# Patient Record
Sex: MALE | Race: OTHER | NOT HISPANIC OR LATINO | Employment: OTHER | URBAN - METROPOLITAN AREA
[De-identification: names, ages, dates, MRNs, and addresses within clinical notes are randomized per-mention and may not be internally consistent; named-entity substitution may affect disease eponyms.]

---

## 2017-07-21 ENCOUNTER — PATIENT MESSAGE (OUTPATIENT)
Dept: MEDICAL GROUP | Facility: MEDICAL CENTER | Age: 67
End: 2017-07-21

## 2017-07-21 NOTE — TELEPHONE ENCOUNTER
Mark,     I received your voicemail left today. Here is the name and address of the Ophthalmology office you were referred to back in 2013. Please let our office know if you need anything else. Have a great weekend.     Arlene   Medical Assistant       Referral information sent to the following:  Ophthalmology    [address]  [place] [id number]  [phone]  ROSALIE MG J

## 2021-01-15 DIAGNOSIS — Z23 NEED FOR VACCINATION: ICD-10-CM

## 2022-04-07 ENCOUNTER — APPOINTMENT (OUTPATIENT)
Dept: RADIOLOGY | Facility: MEDICAL CENTER | Age: 72
DRG: 054 | End: 2022-04-07
Attending: EMERGENCY MEDICINE
Payer: MEDICARE

## 2022-04-07 ENCOUNTER — HOSPITAL ENCOUNTER (INPATIENT)
Facility: MEDICAL CENTER | Age: 72
LOS: 7 days | DRG: 054 | End: 2022-04-14
Attending: EMERGENCY MEDICINE | Admitting: INTERNAL MEDICINE
Payer: MEDICARE

## 2022-04-07 DIAGNOSIS — E11.65 UNCONTROLLED TYPE 2 DIABETES MELLITUS WITH HYPERGLYCEMIA (HCC): ICD-10-CM

## 2022-04-07 DIAGNOSIS — J96.01 ACUTE RESPIRATORY FAILURE WITH HYPOXIA (HCC): ICD-10-CM

## 2022-04-07 DIAGNOSIS — G93.89 BRAIN MASS: ICD-10-CM

## 2022-04-07 LAB
ALBUMIN SERPL BCP-MCNC: 4.4 G/DL (ref 3.2–4.9)
ALBUMIN/GLOB SERPL: 1.4 G/DL
ALP SERPL-CCNC: 106 U/L (ref 30–99)
ALT SERPL-CCNC: 15 U/L (ref 2–50)
ANION GAP SERPL CALC-SCNC: 13 MMOL/L (ref 7–16)
AST SERPL-CCNC: 20 U/L (ref 12–45)
BASOPHILS # BLD AUTO: 0.8 % (ref 0–1.8)
BASOPHILS # BLD: 0.08 K/UL (ref 0–0.12)
BILIRUB SERPL-MCNC: 1.2 MG/DL (ref 0.1–1.5)
BUN SERPL-MCNC: 26 MG/DL (ref 8–22)
CALCIUM SERPL-MCNC: 9.2 MG/DL (ref 8.5–10.5)
CHLORIDE SERPL-SCNC: 102 MMOL/L (ref 96–112)
CO2 SERPL-SCNC: 21 MMOL/L (ref 20–33)
CREAT SERPL-MCNC: 1.01 MG/DL (ref 0.5–1.4)
CRP SERPL HS-MCNC: 0.36 MG/DL (ref 0–0.75)
EOSINOPHIL # BLD AUTO: 0.15 K/UL (ref 0–0.51)
EOSINOPHIL NFR BLD: 1.6 % (ref 0–6.9)
ERYTHROCYTE [DISTWIDTH] IN BLOOD BY AUTOMATED COUNT: 43.6 FL (ref 35.9–50)
GFR SERPLBLD CREATININE-BSD FMLA CKD-EPI: 79 ML/MIN/1.73 M 2
GLOBULIN SER CALC-MCNC: 3.1 G/DL (ref 1.9–3.5)
GLUCOSE SERPL-MCNC: 148 MG/DL (ref 65–99)
HCT VFR BLD AUTO: 36.4 % (ref 42–52)
HGB BLD-MCNC: 12.1 G/DL (ref 14–18)
IMM GRANULOCYTES # BLD AUTO: 0.04 K/UL (ref 0–0.11)
IMM GRANULOCYTES NFR BLD AUTO: 0.4 % (ref 0–0.9)
LYMPHOCYTES # BLD AUTO: 2.48 K/UL (ref 1–4.8)
LYMPHOCYTES NFR BLD: 26.3 % (ref 22–41)
MCH RBC QN AUTO: 28.1 PG (ref 27–33)
MCHC RBC AUTO-ENTMCNC: 33.2 G/DL (ref 33.7–35.3)
MCV RBC AUTO: 84.7 FL (ref 81.4–97.8)
MONOCYTES # BLD AUTO: 0.66 K/UL (ref 0–0.85)
MONOCYTES NFR BLD AUTO: 7 % (ref 0–13.4)
NEUTROPHILS # BLD AUTO: 6.01 K/UL (ref 1.82–7.42)
NEUTROPHILS NFR BLD: 63.9 % (ref 44–72)
NRBC # BLD AUTO: 0 K/UL
NRBC BLD-RTO: 0 /100 WBC
PLATELET # BLD AUTO: 278 K/UL (ref 164–446)
PMV BLD AUTO: 9.1 FL (ref 9–12.9)
POTASSIUM SERPL-SCNC: 3.8 MMOL/L (ref 3.6–5.5)
PROT SERPL-MCNC: 7.5 G/DL (ref 6–8.2)
RBC # BLD AUTO: 4.3 M/UL (ref 4.7–6.1)
SODIUM SERPL-SCNC: 136 MMOL/L (ref 135–145)
WBC # BLD AUTO: 9.4 K/UL (ref 4.8–10.8)

## 2022-04-07 PROCEDURE — 770001 HCHG ROOM/CARE - MED/SURG/GYN PRIV*

## 2022-04-07 PROCEDURE — 85025 COMPLETE CBC W/AUTO DIFF WBC: CPT

## 2022-04-07 PROCEDURE — 36415 COLL VENOUS BLD VENIPUNCTURE: CPT

## 2022-04-07 PROCEDURE — 700111 HCHG RX REV CODE 636 W/ 250 OVERRIDE (IP): Performed by: INTERNAL MEDICINE

## 2022-04-07 PROCEDURE — 96374 THER/PROPH/DIAG INJ IV PUSH: CPT

## 2022-04-07 PROCEDURE — 80053 COMPREHEN METABOLIC PANEL: CPT

## 2022-04-07 PROCEDURE — 700111 HCHG RX REV CODE 636 W/ 250 OVERRIDE (IP)

## 2022-04-07 PROCEDURE — 96375 TX/PRO/DX INJ NEW DRUG ADDON: CPT

## 2022-04-07 PROCEDURE — 700111 HCHG RX REV CODE 636 W/ 250 OVERRIDE (IP): Performed by: EMERGENCY MEDICINE

## 2022-04-07 PROCEDURE — 86140 C-REACTIVE PROTEIN: CPT

## 2022-04-07 PROCEDURE — 99285 EMERGENCY DEPT VISIT HI MDM: CPT

## 2022-04-07 PROCEDURE — 70450 CT HEAD/BRAIN W/O DYE: CPT | Mod: ME

## 2022-04-07 PROCEDURE — 99223 1ST HOSP IP/OBS HIGH 75: CPT | Mod: AI | Performed by: INTERNAL MEDICINE

## 2022-04-07 RX ORDER — ONDANSETRON 2 MG/ML
4 INJECTION INTRAMUSCULAR; INTRAVENOUS ONCE
Status: COMPLETED | OUTPATIENT
Start: 2022-04-07 | End: 2022-04-07

## 2022-04-07 RX ORDER — FLUTICASONE PROPIONATE 50 MCG
1 SPRAY, SUSPENSION (ML) NASAL DAILY
Status: DISCONTINUED | OUTPATIENT
Start: 2022-04-08 | End: 2022-04-08

## 2022-04-07 RX ORDER — OXYCODONE HYDROCHLORIDE 5 MG/1
5 TABLET ORAL
Status: DISCONTINUED | OUTPATIENT
Start: 2022-04-07 | End: 2022-04-14 | Stop reason: HOSPADM

## 2022-04-07 RX ORDER — DEXAMETHASONE SODIUM PHOSPHATE 4 MG/ML
10 INJECTION, SOLUTION INTRA-ARTICULAR; INTRALESIONAL; INTRAMUSCULAR; INTRAVENOUS; SOFT TISSUE ONCE
Status: DISCONTINUED | OUTPATIENT
Start: 2022-04-07 | End: 2022-04-07

## 2022-04-07 RX ORDER — GABAPENTIN 400 MG/1
1200 CAPSULE ORAL 2 TIMES DAILY
Status: DISCONTINUED | OUTPATIENT
Start: 2022-04-08 | End: 2022-04-14 | Stop reason: HOSPADM

## 2022-04-07 RX ORDER — ALBUTEROL SULFATE 90 UG/1
2 AEROSOL, METERED RESPIRATORY (INHALATION) EVERY 6 HOURS PRN
Status: DISCONTINUED | OUTPATIENT
Start: 2022-04-07 | End: 2022-04-14 | Stop reason: HOSPADM

## 2022-04-07 RX ORDER — LORAZEPAM 2 MG/ML
1 INJECTION INTRAMUSCULAR
Status: DISCONTINUED | OUTPATIENT
Start: 2022-04-07 | End: 2022-04-14 | Stop reason: HOSPADM

## 2022-04-07 RX ORDER — HYDROXYZINE HYDROCHLORIDE 25 MG/1
25 TABLET, FILM COATED ORAL EVERY 8 HOURS PRN
Status: DISCONTINUED | OUTPATIENT
Start: 2022-04-07 | End: 2022-04-08

## 2022-04-07 RX ORDER — CIPROFLOXACIN HYDROCHLORIDE 3.5 MG/ML
1 SOLUTION/ DROPS TOPICAL
Status: DISCONTINUED | OUTPATIENT
Start: 2022-04-08 | End: 2022-04-08

## 2022-04-07 RX ORDER — HALOPERIDOL 5 MG/ML
5 INJECTION INTRAMUSCULAR EVERY 8 HOURS PRN
Status: DISCONTINUED | OUTPATIENT
Start: 2022-04-07 | End: 2022-04-08

## 2022-04-07 RX ORDER — HEPARIN SODIUM 5000 [USP'U]/ML
5000 INJECTION, SOLUTION INTRAVENOUS; SUBCUTANEOUS EVERY 8 HOURS
Status: DISCONTINUED | OUTPATIENT
Start: 2022-04-08 | End: 2022-04-08

## 2022-04-07 RX ORDER — OXYCODONE HYDROCHLORIDE 5 MG/1
2.5 TABLET ORAL
Status: DISCONTINUED | OUTPATIENT
Start: 2022-04-07 | End: 2022-04-14 | Stop reason: HOSPADM

## 2022-04-07 RX ORDER — LISINOPRIL 10 MG/1
10 TABLET ORAL DAILY
Status: DISCONTINUED | OUTPATIENT
Start: 2022-04-08 | End: 2022-04-08

## 2022-04-07 RX ORDER — DEXAMETHASONE 4 MG/1
10 TABLET ORAL EVERY 6 HOURS
Status: DISCONTINUED | OUTPATIENT
Start: 2022-04-08 | End: 2022-04-08

## 2022-04-07 RX ORDER — DEXTROSE MONOHYDRATE 25 G/50ML
25 INJECTION, SOLUTION INTRAVENOUS
Status: DISCONTINUED | OUTPATIENT
Start: 2022-04-07 | End: 2022-04-08

## 2022-04-07 RX ORDER — BISACODYL 10 MG
10 SUPPOSITORY, RECTAL RECTAL
Status: DISCONTINUED | OUTPATIENT
Start: 2022-04-07 | End: 2022-04-14 | Stop reason: HOSPADM

## 2022-04-07 RX ORDER — AMOXICILLIN 250 MG
2 CAPSULE ORAL 2 TIMES DAILY
Status: DISCONTINUED | OUTPATIENT
Start: 2022-04-07 | End: 2022-04-14 | Stop reason: HOSPADM

## 2022-04-07 RX ORDER — HYDRALAZINE HYDROCHLORIDE 20 MG/ML
10 INJECTION INTRAMUSCULAR; INTRAVENOUS EVERY 4 HOURS PRN
Status: DISCONTINUED | OUTPATIENT
Start: 2022-04-07 | End: 2022-04-14 | Stop reason: HOSPADM

## 2022-04-07 RX ORDER — GEMFIBROZIL 600 MG/1
600 TABLET, FILM COATED ORAL 2 TIMES DAILY
Status: DISCONTINUED | OUTPATIENT
Start: 2022-04-07 | End: 2022-04-08

## 2022-04-07 RX ORDER — GLIPIZIDE 5 MG/1
20 TABLET, FILM COATED, EXTENDED RELEASE ORAL DAILY
Status: DISCONTINUED | OUTPATIENT
Start: 2022-04-08 | End: 2022-04-08

## 2022-04-07 RX ORDER — HYDROMORPHONE HYDROCHLORIDE 1 MG/ML
0.25 INJECTION, SOLUTION INTRAMUSCULAR; INTRAVENOUS; SUBCUTANEOUS
Status: DISCONTINUED | OUTPATIENT
Start: 2022-04-07 | End: 2022-04-14 | Stop reason: HOSPADM

## 2022-04-07 RX ORDER — OLOPATADINE HYDROCHLORIDE 1 MG/ML
1 SOLUTION/ DROPS OPHTHALMIC 2 TIMES DAILY
Status: DISCONTINUED | OUTPATIENT
Start: 2022-04-07 | End: 2022-04-08

## 2022-04-07 RX ORDER — TRAMADOL HYDROCHLORIDE 50 MG/1
50 TABLET ORAL EVERY 12 HOURS PRN
Status: DISCONTINUED | OUTPATIENT
Start: 2022-04-07 | End: 2022-04-14 | Stop reason: HOSPADM

## 2022-04-07 RX ORDER — POLYETHYLENE GLYCOL 3350 17 G/17G
1 POWDER, FOR SOLUTION ORAL
Status: DISCONTINUED | OUTPATIENT
Start: 2022-04-07 | End: 2022-04-14 | Stop reason: HOSPADM

## 2022-04-07 RX ORDER — GABAPENTIN 300 MG/1
300 CAPSULE ORAL
Status: DISCONTINUED | OUTPATIENT
Start: 2022-04-07 | End: 2022-04-08

## 2022-04-07 RX ADMIN — LORAZEPAM 1 MG: 2 INJECTION INTRAMUSCULAR; INTRAVENOUS at 23:48

## 2022-04-07 RX ADMIN — FENTANYL CITRATE 100 MCG: 50 INJECTION, SOLUTION INTRAMUSCULAR; INTRAVENOUS at 21:28

## 2022-04-07 RX ADMIN — LORAZEPAM 1 MG: 2 INJECTION INTRAMUSCULAR; INTRAVENOUS at 23:51

## 2022-04-07 RX ADMIN — ONDANSETRON 4 MG: 2 INJECTION INTRAMUSCULAR; INTRAVENOUS at 21:28

## 2022-04-07 ASSESSMENT — PAIN DESCRIPTION - PAIN TYPE: TYPE: ACUTE PAIN

## 2022-04-08 ENCOUNTER — APPOINTMENT (OUTPATIENT)
Dept: RADIOLOGY | Facility: MEDICAL CENTER | Age: 72
DRG: 054 | End: 2022-04-08
Attending: NEUROLOGICAL SURGERY
Payer: MEDICARE

## 2022-04-08 PROBLEM — G93.40 ENCEPHALOPATHY ACUTE: Status: ACTIVE | Noted: 2022-04-08

## 2022-04-08 PROBLEM — G92.8 TOXIC METABOLIC ENCEPHALOPATHY: Status: ACTIVE | Noted: 2022-04-08

## 2022-04-08 PROBLEM — J96.01 ACUTE RESPIRATORY FAILURE WITH HYPOXIA (HCC): Status: ACTIVE | Noted: 2022-04-08

## 2022-04-08 LAB
GLUCOSE BLD STRIP.AUTO-MCNC: 120 MG/DL (ref 65–99)
GLUCOSE BLD STRIP.AUTO-MCNC: 174 MG/DL (ref 65–99)
GLUCOSE BLD STRIP.AUTO-MCNC: 225 MG/DL (ref 65–99)
GLUCOSE BLD STRIP.AUTO-MCNC: 244 MG/DL (ref 65–99)

## 2022-04-08 PROCEDURE — 96376 TX/PRO/DX INJ SAME DRUG ADON: CPT

## 2022-04-08 PROCEDURE — 82962 GLUCOSE BLOOD TEST: CPT | Mod: 91

## 2022-04-08 PROCEDURE — A9270 NON-COVERED ITEM OR SERVICE: HCPCS | Performed by: INTERNAL MEDICINE

## 2022-04-08 PROCEDURE — 700102 HCHG RX REV CODE 250 W/ 637 OVERRIDE(OP): Performed by: INTERNAL MEDICINE

## 2022-04-08 PROCEDURE — 700111 HCHG RX REV CODE 636 W/ 250 OVERRIDE (IP): Performed by: INTERNAL MEDICINE

## 2022-04-08 PROCEDURE — 96372 THER/PROPH/DIAG INJ SC/IM: CPT

## 2022-04-08 PROCEDURE — 700111 HCHG RX REV CODE 636 W/ 250 OVERRIDE (IP)

## 2022-04-08 PROCEDURE — 99233 SBSQ HOSP IP/OBS HIGH 50: CPT | Performed by: INTERNAL MEDICINE

## 2022-04-08 PROCEDURE — 770001 HCHG ROOM/CARE - MED/SURG/GYN PRIV*

## 2022-04-08 PROCEDURE — 96375 TX/PRO/DX INJ NEW DRUG ADDON: CPT

## 2022-04-08 RX ORDER — TRAMADOL HYDROCHLORIDE 50 MG/1
50 TABLET ORAL EVERY 6 HOURS PRN
Status: ON HOLD | COMMUNITY
End: 2022-04-13

## 2022-04-08 RX ORDER — FLUOXETINE 10 MG/1
20 CAPSULE ORAL DAILY
COMMUNITY

## 2022-04-08 RX ORDER — VENLAFAXINE HYDROCHLORIDE 75 MG/1
75 CAPSULE, EXTENDED RELEASE ORAL DAILY
Status: DISCONTINUED | OUTPATIENT
Start: 2022-04-08 | End: 2022-04-08

## 2022-04-08 RX ORDER — OMEPRAZOLE 40 MG/1
40 CAPSULE, DELAYED RELEASE ORAL DAILY
COMMUNITY

## 2022-04-08 RX ORDER — LORAZEPAM 2 MG/ML
1 INJECTION INTRAMUSCULAR ONCE
Status: COMPLETED | OUTPATIENT
Start: 2022-04-08 | End: 2022-04-07

## 2022-04-08 RX ORDER — DEXAMETHASONE SODIUM PHOSPHATE 4 MG/ML
10 INJECTION, SOLUTION INTRA-ARTICULAR; INTRALESIONAL; INTRAMUSCULAR; INTRAVENOUS; SOFT TISSUE EVERY 6 HOURS
Status: DISCONTINUED | OUTPATIENT
Start: 2022-04-08 | End: 2022-04-08

## 2022-04-08 RX ORDER — SUVOREXANT 10 MG/1
10 TABLET, FILM COATED ORAL
Status: ON HOLD | COMMUNITY
End: 2022-04-13

## 2022-04-08 RX ORDER — DEXTROSE MONOHYDRATE 25 G/50ML
25 INJECTION, SOLUTION INTRAVENOUS
Status: DISCONTINUED | OUTPATIENT
Start: 2022-04-08 | End: 2022-04-10

## 2022-04-08 RX ORDER — ATORVASTATIN CALCIUM 10 MG/1
10 TABLET, FILM COATED ORAL NIGHTLY
Status: DISCONTINUED | OUTPATIENT
Start: 2022-04-08 | End: 2022-04-14 | Stop reason: HOSPADM

## 2022-04-08 RX ORDER — OMEPRAZOLE 20 MG/1
40 CAPSULE, DELAYED RELEASE ORAL DAILY
Status: DISCONTINUED | OUTPATIENT
Start: 2022-04-08 | End: 2022-04-12

## 2022-04-08 RX ORDER — ATORVASTATIN CALCIUM 10 MG/1
10 TABLET, FILM COATED ORAL NIGHTLY
COMMUNITY

## 2022-04-08 RX ORDER — GABAPENTIN 600 MG/1
1200 TABLET ORAL 2 TIMES DAILY
Status: DISCONTINUED | OUTPATIENT
Start: 2022-04-08 | End: 2022-04-08

## 2022-04-08 RX ORDER — LISINOPRIL 20 MG/1
20 TABLET ORAL DAILY
COMMUNITY

## 2022-04-08 RX ORDER — FLUOXETINE HYDROCHLORIDE 20 MG/1
20 CAPSULE ORAL DAILY
Status: DISCONTINUED | OUTPATIENT
Start: 2022-04-08 | End: 2022-04-14 | Stop reason: HOSPADM

## 2022-04-08 RX ORDER — LISINOPRIL 20 MG/1
20 TABLET ORAL DAILY
Status: DISCONTINUED | OUTPATIENT
Start: 2022-04-08 | End: 2022-04-14 | Stop reason: HOSPADM

## 2022-04-08 RX ORDER — HYDROXYZINE HYDROCHLORIDE 10 MG/1
10 TABLET, FILM COATED ORAL EVERY 8 HOURS PRN
COMMUNITY

## 2022-04-08 RX ORDER — HYDROXYZINE HYDROCHLORIDE 10 MG/1
10 TABLET, FILM COATED ORAL EVERY 8 HOURS PRN
Status: DISCONTINUED | OUTPATIENT
Start: 2022-04-08 | End: 2022-04-08

## 2022-04-08 RX ORDER — VENLAFAXINE HYDROCHLORIDE 75 MG/1
75 CAPSULE, EXTENDED RELEASE ORAL DAILY
COMMUNITY

## 2022-04-08 RX ORDER — DEXAMETHASONE SODIUM PHOSPHATE 4 MG/ML
4 INJECTION, SOLUTION INTRA-ARTICULAR; INTRALESIONAL; INTRAMUSCULAR; INTRAVENOUS; SOFT TISSUE EVERY 6 HOURS
Status: DISCONTINUED | OUTPATIENT
Start: 2022-04-08 | End: 2022-04-14 | Stop reason: HOSPADM

## 2022-04-08 RX ORDER — GABAPENTIN 600 MG/1
1200 TABLET ORAL 2 TIMES DAILY
COMMUNITY

## 2022-04-08 RX ORDER — GLIPIZIDE 10 MG/1
10 TABLET ORAL 2 TIMES DAILY
COMMUNITY

## 2022-04-08 RX ORDER — HALOPERIDOL 5 MG/ML
1 INJECTION INTRAMUSCULAR EVERY 8 HOURS
Status: DISCONTINUED | OUTPATIENT
Start: 2022-04-08 | End: 2022-04-14 | Stop reason: HOSPADM

## 2022-04-08 RX ORDER — LEVETIRACETAM 500 MG/1
500 TABLET ORAL 2 TIMES DAILY
Status: DISCONTINUED | OUTPATIENT
Start: 2022-04-08 | End: 2022-04-14 | Stop reason: HOSPADM

## 2022-04-08 RX ADMIN — DEXAMETHASONE SODIUM PHOSPHATE 10 MG: 4 INJECTION, SOLUTION INTRA-ARTICULAR; INTRALESIONAL; INTRAMUSCULAR; INTRAVENOUS; SOFT TISSUE at 00:40

## 2022-04-08 RX ADMIN — LEVETIRACETAM 500 MG: 500 TABLET, FILM COATED ORAL at 14:09

## 2022-04-08 RX ADMIN — INSULIN HUMAN 3 UNITS: 100 INJECTION, SOLUTION PARENTERAL at 18:00

## 2022-04-08 RX ADMIN — LORAZEPAM 1 MG: 2 INJECTION INTRAMUSCULAR; INTRAVENOUS at 01:48

## 2022-04-08 RX ADMIN — LISINOPRIL 20 MG: 20 TABLET ORAL at 14:09

## 2022-04-08 RX ADMIN — LEVETIRACETAM 500 MG: 500 TABLET, FILM COATED ORAL at 21:09

## 2022-04-08 RX ADMIN — ATORVASTATIN CALCIUM 10 MG: 10 TABLET, FILM COATED ORAL at 20:48

## 2022-04-08 RX ADMIN — DEXAMETHASONE SODIUM PHOSPHATE 10 MG: 4 INJECTION, SOLUTION INTRA-ARTICULAR; INTRALESIONAL; INTRAMUSCULAR; INTRAVENOUS; SOFT TISSUE at 05:38

## 2022-04-08 RX ADMIN — OMEPRAZOLE 40 MG: 20 CAPSULE, DELAYED RELEASE ORAL at 14:08

## 2022-04-08 RX ADMIN — HALOPERIDOL LACTATE 1 MG: 5 INJECTION, SOLUTION INTRAMUSCULAR at 21:04

## 2022-04-08 RX ADMIN — INSULIN HUMAN 1 UNITS: 100 INJECTION, SOLUTION PARENTERAL at 05:37

## 2022-04-08 RX ADMIN — HALOPERIDOL LACTATE 5 MG: 5 INJECTION, SOLUTION INTRAMUSCULAR at 10:11

## 2022-04-08 RX ADMIN — HALOPERIDOL LACTATE 5 MG: 5 INJECTION, SOLUTION INTRAMUSCULAR at 00:59

## 2022-04-08 RX ADMIN — DEXAMETHASONE SODIUM PHOSPHATE 4 MG: 4 INJECTION, SOLUTION INTRA-ARTICULAR; INTRALESIONAL; INTRAMUSCULAR; INTRAVENOUS; SOFT TISSUE at 18:00

## 2022-04-08 RX ADMIN — FLUOXETINE 20 MG: 20 CAPSULE ORAL at 14:08

## 2022-04-08 RX ADMIN — SENNOSIDES AND DOCUSATE SODIUM 2 TABLET: 50; 8.6 TABLET ORAL at 18:00

## 2022-04-08 RX ADMIN — HEPARIN SODIUM 5000 UNITS: 5000 INJECTION, SOLUTION INTRAVENOUS; SUBCUTANEOUS at 05:35

## 2022-04-08 RX ADMIN — LORAZEPAM 1 MG: 2 INJECTION INTRAMUSCULAR; INTRAVENOUS at 10:11

## 2022-04-08 RX ADMIN — DEXAMETHASONE SODIUM PHOSPHATE 4 MG: 4 INJECTION, SOLUTION INTRA-ARTICULAR; INTRALESIONAL; INTRAMUSCULAR; INTRAVENOUS; SOFT TISSUE at 14:09

## 2022-04-08 ASSESSMENT — PAIN DESCRIPTION - PAIN TYPE
TYPE: ACUTE PAIN
TYPE: ACUTE PAIN

## 2022-04-08 NOTE — ASSESSMENT & PLAN NOTE
Secondary to brain mass  Haldol 1mg IV every 8 hours   Seizure aspiration and fall precautions   Neurosurgery following   Continue steroids and keppra

## 2022-04-08 NOTE — ED NOTES
Med rec completed per Osiel (pt fills his medications at a [facility] in [place])

## 2022-04-08 NOTE — PROGRESS NOTES
Pts partner came to bedside and brought home medications (lisinopril and metformin). Partner (Jose Valenzuela) states that pt only takes these two medications because the others make him sick.

## 2022-04-08 NOTE — CONSULTS
Patient presents to the ER with visual obscuration and finding on CT scan of right parietal mass with vasogenic edema and midline shift.    Per reports patient aside from vision is intact.    Plan  MRI of the brain with and without  MRI stealth brain with and without  Dexamethasone 10 every 6 for now  Hold Keppra  I will see first thing in the morning.    Lm Lewis

## 2022-04-08 NOTE — ED NOTES
RN to bedside after hearing patient yell out while RN was in room next door. RN visualized pt on knees at the end of the gurney. Pt self removed PIV. Pt assisted to bed and assessed by ERP, Dr. Freitas. VSS. Pt AxO 4. Pt educated again on call light and safety precautions. New PIV placed. Pt verbalized and demonstrated understanding. RN left room.  RN returned to bedside. Pt agitated, anxious, and thrashing in bed attempting to remove equipment and pull out PIV screaming about how anxious he is and that he cannot have all of the wires on him. RN notified Dr. Salinas and new orders received. Pt placed in bilateral soft wrist restraints and given a total of 2mg IV lorazepam. Admitting MD to bedside for assessment. Pt placed on 2L NC post lorazepam administration. Safety precautions remain in place. Report given to SUE Schultz. Care relinquished.

## 2022-04-08 NOTE — ASSESSMENT & PLAN NOTE
Regular insulin sliding scale every 6 hours as needed  Lantus added 4/9, CTM, titrate daily to goal bg

## 2022-04-08 NOTE — PROGRESS NOTES
[facility] Medicine Daily Progress Note    Date of Service  4/8/2022    Chief Complaint  Mark Marcelo is a 72 y.o. male admitted 4/7/2022 with headache.     Hospital Course  Mark Marcelo is a 72 y.o. male with history of diabetes, hypertension, dyslipidemia who presented 4/7/2022 with 12 hours of worsening headache prompting evaluation emergency department where he is found to have a right temporal mass concerning for glioblastoma    Interval Problem Update  Patient was seen and examined at bedside.      A&O x0, opens eyes to voice, follows commands  Very agitated requiring ativan and bilateral wrist restraints for safety  Await MRI brain  Neurosurgery recs  Keppra 500mg BID    I have personally seen and examined the patient at bedside. I discussed the plan of care with patient, bedside RN and neurosurgery.    Consultants/Specialty  neurosurgery    Code Status  Full Code    Disposition  Patient is not medically cleared for discharge.   Anticipate discharge to to home with close outpatient follow-up.  I have placed the appropriate orders for post-discharge needs.    Review of Systems  Review of Systems   Unable to perform ROS: Acuity of condition        Physical Exam  Temp:  [36.3 °C (97.4 °F)-36.9 °C (98.5 °F)] 36.9 °C (98.5 °F)  Pulse:  [76-90] 81  Resp:  [16-23] 23  BP: ()/(59-86) 99/59  SpO2:  [84 %-98 %] 96 %    Physical Exam  Vitals and nursing note reviewed.   Constitutional:       General: He is not in acute distress.     Appearance: He is ill-appearing.   HENT:      Head: Normocephalic.      Right Ear: External ear normal.      Left Ear: External ear normal.      Nose: Nose normal. No congestion.      Mouth/Throat:      Mouth: Mucous membranes are moist.      Pharynx: No oropharyngeal exudate.   Eyes:      General: No scleral icterus.     Extraocular Movements: Extraocular movements intact.      Pupils: Pupils are equal, round, and reactive to light.   Cardiovascular:      Rate and Rhythm: Normal rate and  regular rhythm.      Heart sounds: No murmur heard.  Pulmonary:      Breath sounds: No wheezing.   Abdominal:      Palpations: Abdomen is soft.      Tenderness: There is no abdominal tenderness. There is no guarding or rebound.   Musculoskeletal:         General: No swelling or deformity.   Skin:     General: Skin is warm.      Coloration: Skin is not jaundiced.      Findings: No bruising.   Neurological:      Mental Status: He is alert.      Comments: Opens eyes to voice  Moves all extremities spontaneously  Follows commands (squeezes hand when instructed)  Nonsensical speech    Psychiatric:         Speech: He is noncommunicative.         Behavior: Behavior is agitated, aggressive and combative.         Fluids    Intake/Output Summary (Last 24 hours) at 4/8/2022 1124  Last data filed at 4/8/2022 0110  Gross per 24 hour   Intake --   Output 300 ml   Net -300 ml       Laboratory  Recent Labs     04/07/22  2131   WBC 9.4   RBC 4.30*   HEMOGLOBIN 12.1*   HEMATOCRIT 36.4*   MCV 84.7   MCH 28.1   MCHC 33.2*   RDW 43.6   PLATELETCT 278   MPV 9.1     Recent Labs     04/07/22  2131   SODIUM 136   POTASSIUM 3.8   CHLORIDE 102   CO2 21   GLUCOSE 148*   BUN 26*   CREATININE 1.01   CALCIUM 9.2                   Imaging  CT-HEAD W/O   Final Result         1.  Heterogeneous right parietal mass with associated vasogenic edema resulting in effacement of the posterior right ventricle and 5 mm right to left midline shift. Further characterization with MRI of the brain with contrast recommended.   2.  Atherosclerosis.         MR-STEALTH BRAIN WITH & W/O    (Results Pending)   MR-BRAIN-WITH & W/O    (Results Pending)        Assessment/Plan  * Brain mass- (present on admission)  Assessment & Plan  Complicated by vasogenic edema and a 5 mm right to left shift.   Visual field compromise  Dexamethasone 10mg q6 x12 hours  Then dexamethasone 4mg q6   Keppra 500mg BID  Await MRI brain   Neurosurgery recs    Acute respiratory failure with  hypoxia (HCC)  Assessment & Plan  Possibly sequelae of sedation  SpO2 84% on room air  Supplemental oxygen to maintain SpO2 >92%  monitor    Toxic metabolic encephalopathy  Assessment & Plan  Secondary to brain mass  Ativan 1 mg x 2, soft wrist restraints.  Haldol 1mg IV every 8 hours     Diabetes type 2, uncontrolled (HCC)- (present on admission)  Assessment & Plan  Regular insulin sliding scale every 6 hours as needed, follow-up A1c    Depression- (present on admission)  Assessment & Plan  Home SSRI       VTE prophylaxis: SCDs/TEDs and enoxaparin ppx    I have performed a physical exam and reviewed and updated ROS and Plan today (4/8/2022). In review of yesterday's note (4/7/2022), there are no changes except as documented above.

## 2022-04-08 NOTE — H&P
Hospital Medicine History & Physical Note    Date of Service  4/7/22    Primary Care Physician  MELECIO Saunders.    Consultants  neurosurgery    Specialist Names: Dr. Lewis    Code Status  Full Code    Chief Complaint  Chief Complaint   Patient presents with   • Headache     Pt reports frontal headache for approx 12 hrs, progressively getting worse    • Eye Pain     Pt reports pain behind right eye for approx 12 hrs       History of Presenting Illness  Mark Marcelo is a 72 y.o. male with history of diabetes, hypertension, dyslipidemia who presented 4/7/2022 with 12 hours of worsening headache prompting evaluation emergency department where he is found to have a right temporal mass concerning for glioblastoma.  Neurosurgery is consulted recommending Decadron.  Patient receives IV fentanyl and is referred to the hospitalist for admission.  MD contacted by nurse prior to bedside eval.  He has developed delirium and agitation.  Ripping out his IV and kneeling on the ground yelling he has mental health issues, denies alcohol or drugs.  He is unable to elaborate upon his mental health.  He receives Ativan 1 mg IV x2 and is seen at bedside asleep in no acute distress.      I discussed the plan of care with patient.    Review of Systems  Review of Systems   Unable to perform ROS: Mental status change (Sedation)       Past Medical History   has a past medical history of Depression, Diabetes, Diabetes type 2, uncontrolled (HCC) (7/25/2013), Dyspnea (7/25/2013), Hyperlipidemia, Hypertension associated with diabetes (HCC), Insomnia, Joint pain (7/25/2013), Neuropathy (2/16/2013), OPTIC NEURITIS (7/25/2013), Pseudophakia of right eye (7/30/2013), Ulcer, and Vitamin d deficiency.    Surgical History   has a past surgical history that includes orif, ankle; gastric resection; retinal detachment repair; and cataract phaco with iol (4/18/13).     Family History  family history includes Diabetes in his maternal grandmother and  paternal grandmother; Heart Disease in his mother.   Family history reviewed with patient. There is no family history that is pertinent to the chief complaint.     Social History   reports that he quit smoking about 12 years ago. His smoking use included cigarettes. He has a 40.00 pack-year smoking history. He has never used smokeless tobacco. He reports that he does not drink alcohol and does not use drugs.    Allergies  Allergies   Allergen Reactions   • Acetaminophen Vomiting   • Advil [Ibuprofen]        Medications  Prior to Admission Medications   Prescriptions Last Dose Informant Patient Reported? Taking?   Blood Glucose Monitoring Suppl SUPPLIES MISC   No No   Si Device by Does not apply route 2 times a day as needed. Glucose monitor of pt choice with Test strips and lancets for monitor.  Check glucose bid  Dx 250.00   PROAIR  (90 BASE) MCG/ACT AERS   No No   Sig: Inhale 2 Puffs by mouth every 6 hours as needed for Shortness of Breath.   ciprofloxacin (CILOXIN) 0.3 % SOLN   No No   Sig: Place 1 Drop in both eyes every 2 hours.   fluticasone (FLONASE) 50 MCG/ACT nasal spray   No No   Sig: Spray 1 Spray in nose every day.   gabapentin (NEURONTIN) 100 MG Cap   No No   Sig: Take 1 Cap by mouth every evening.   gabapentin (NEURONTIN) 300 MG Cap   No No   Sig: Take 1 Cap by mouth every bedtime.   gemfibrozil (LOPID) 600 MG Tab   No No   Sig: Take 1 Tab by mouth 2 times a day.   glipiZIDE SR (GLUCOTROL) 10 MG TABLET SR 24 HR   No No   Sig: Take 2 Tabs by mouth every day.   hydrOXYzine (ATARAX) 25 MG Tab   No No   Sig: Take 1 Tab by mouth every 8 hours as needed for Itching.   ketoconazole (NIZORAL) 2 % Cream   No No   Sig: Apply 1 Application to affected area(s) 2 times a day.   lisinopril (PRINIVIL) 10 MG Tab   No No   Sig: Take 1 Tab by mouth every day.   lisinopril (PRINIVIL) 20 MG Tab   No No   Sig: TAKE ONE TABLET BY MOUTH ONCE DAILY   loratadine (CLARITIN) 10 MG TABS   Yes No   Sig: Take 10 mg by  mouth every day.   metformin (GLUCOPHAGE) 1000 MG tablet   No No   Sig: Take 1 Tab by mouth 2 times a day, with meals.   olopatadine (PATANOL) 0.1 % ophthalmic solution   No No   Sig: Place 1 Drop in both eyes 2 times a day.   omeprazole (PRILOSEC) 20 MG delayed-release capsule   No No   Sig: Take 1 Cap by mouth every day.   simvastatin (ZOCOR) 20 MG Tab   No No   Sig: Take 1 Tab by mouth every evening.   terbinafine (LAMISIL) 250 MG TABS   No No   Sig: TAKE ONE TABLET BY MOUTH EVERY DAY   tramadol (ULTRAM) 50 MG Tab   No No   Sig: Take 1-2 Tabs by mouth every 12 hours as needed.   vitamin D, Ergocalciferol, (DRISDOL) 14771 UNITS Cap capsule   No No   Sig: Take 1 Cap by mouth every 7 days.      Facility-Administered Medications: None       Physical Exam  Temp:  [36.3 °C (97.4 °F)] 36.3 °C (97.4 °F)  Pulse:  [76-83] 83  Resp:  [18] 18  BP: (135-159)/(74-86) 151/80  SpO2:  [92 %-96 %] 92 %  Blood Pressure : 151/80   Temperature: 36.3 °C (97.4 °F)   Pulse: 83   Respiration: 18   Pulse Oximetry: 92 %       Physical Exam  Vitals and nursing note reviewed.   Constitutional:       General: He is not in acute distress.     Appearance: Normal appearance. He is normal weight. He is not toxic-appearing.   HENT:      Head: Normocephalic and atraumatic.      Nose: Nose normal. No congestion or rhinorrhea.      Mouth/Throat:      Mouth: Mucous membranes are moist.      Pharynx: Oropharynx is clear.   Eyes:      Extraocular Movements: Extraocular movements intact.      Conjunctiva/sclera: Conjunctivae normal.      Pupils: Pupils are equal, round, and reactive to light.   Neck:      Vascular: No carotid bruit.   Cardiovascular:      Rate and Rhythm: Normal rate and regular rhythm.      Pulses: Normal pulses.      Heart sounds: Normal heart sounds. No murmur heard.    No gallop.   Pulmonary:      Effort: No respiratory distress.      Breath sounds: Normal breath sounds. No wheezing or rales.   Abdominal:      General: Abdomen is  flat. Bowel sounds are normal. There is no distension.      Palpations: Abdomen is soft. There is no mass.      Tenderness: There is no abdominal tenderness.      Hernia: No hernia is present.   Musculoskeletal:         General: No tenderness or signs of injury.      Cervical back: Normal range of motion and neck supple. No muscular tenderness.   Lymphadenopathy:      Cervical: No cervical adenopathy.   Skin:     Capillary Refill: Capillary refill takes less than 2 seconds.      Coloration: Skin is not jaundiced or pale.      Findings: No bruising.      Comments: Chronic skin change of the lower extremities suggestive of peripheral vascular disease.   Neurological:      General: No focal deficit present.      Mental Status: He is alert and oriented to person, place, and time. Mental status is at baseline.      Cranial Nerves: No cranial nerve deficit.      Motor: No weakness.      Coordination: Coordination normal.   Psychiatric:         Mood and Affect: Mood normal.         Thought Content: Thought content normal.         Judgment: Judgment normal.         Laboratory:  Recent Labs     04/07/22  2131   WBC 9.4   RBC 4.30*   HEMOGLOBIN 12.1*   HEMATOCRIT 36.4*   MCV 84.7   MCH 28.1   MCHC 33.2*   RDW 43.6   PLATELETCT 278   MPV 9.1     Recent Labs     04/07/22  2131   SODIUM 136   POTASSIUM 3.8   CHLORIDE 102   CO2 21   GLUCOSE 148*   BUN 26*   CREATININE 1.01   CALCIUM 9.2     Recent Labs     04/07/22  2131   ALTSGPT 15   ASTSGOT 20   ALKPHOSPHAT 106*   TBILIRUBIN 1.2   GLUCOSE 148*         No results for input(s): NTPROBNP in the last 72 hours.      No results for input(s): TROPONINT in the last 72 hours.    Imaging:  CT-HEAD W/O   Final Result         1.  Heterogeneous right parietal mass with associated vasogenic edema resulting in effacement of the posterior right ventricle and 5 mm right to left midline shift. Further characterization with MRI of the brain with contrast recommended.   2.  Atherosclerosis.          MR-STEALTH BRAIN WITH & W/O    (Results Pending)   MR-BRAIN-WITH & W/O    (Results Pending)       X-Ray:  I have personally reviewed the images and compared with prior images.    Assessment/Plan:  I anticipate this patient will require at least two midnights for appropriate medical management, necessitating inpatient admission.    * Brain mass- (present on admission)  Assessment & Plan  Complicated by vasogenic edema and a 5 mm right to left shift.  Decadron ordered, follow-up MRI brain and neurosurgery consult    Encephalopathy acute  Assessment & Plan  Patient became agitated shortly after disclosure of findings.  Stating he has mental health issues but unable to specify.  He denies current medications or treatment.  He requires Ativan 1 mg x 2, soft wrist restraints.  Haldol 5 mg IV every 8 hours as needed    Diabetes type 2, uncontrolled (HCC)- (present on admission)  Assessment & Plan  Regular insulin sliding scale every 6 hours as needed, follow-up A1c    Depression- (present on admission)  Assessment & Plan  Consider SSRI trial      VTE prophylaxis: SCDs/TEDs

## 2022-04-08 NOTE — ED NOTES
Report from RYAN Rn. Pt in MRI. Per MRI tech pt unable to stay still in MRI. This RN to MRI to medicate pt for procedure.

## 2022-04-08 NOTE — ASSESSMENT & PLAN NOTE
Possibly sequelae of sedation  SpO2 84% on room air  Supplemental oxygen to maintain SpO2 >92%  Now resolved

## 2022-04-08 NOTE — ED TRIAGE NOTES
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     Pt reports having a headache and pain behind his right eye for approx 12 hrs. Pt states that he had a retinal detachment surgery on his right eye about 2.5 yrs ago.     Pt reports he took 2 baby aspirin for his headache without relief.   Pt denies any recent trauma or recent N/V/D.     Explained triage process and to notify staff of any changes.

## 2022-04-08 NOTE — ED NOTES
Patient resting.  No signs of distress noted.   Equal chest rise and fall.   No further needs at this time.

## 2022-04-08 NOTE — ASSESSMENT & PLAN NOTE
MRI showing infiltrative mass in the right parieto-occipital region complicated by vasogenic edema and a 5 mm right to left shift, consistent with high-grade glioma   Visual field compromise  dexamethasone 4mg q6   Keppra 500mg BID  CT C/A/P without obvious primary malignancy seen   Neurosurgery following, pt and SO want care in their home [facility] [place] transfer requested and accepted

## 2022-04-08 NOTE — ED NOTES
Unable to obtain medrec at this time, per patient's nurse, the patient is agitated and currently in soft restraints, unable to interview

## 2022-04-08 NOTE — CONSULTS
Chief complaint right parietal lesion  History of present illness this is a 72-year-old gentleman comes in with altered mental status and vision cut he was combative last night got sedated is nonfocal in terms of examination aside from concern for visual field cut and has imaging with CT scan demonstrating significant mount of vasogenic edema and what appears to be likely an infiltrative lesion in the right parietal region.  He has no other history that we know of.    12 point review of systems was positive for disorientation and visual field cut to the left.    Past medical history unknown  Past surgical history noncontributory  Medications please see EMR.    Physical examination the patient is currently sedated he was combative last night but prior to that apparently he was oriented and not confused he did not have any focal weakness or sensory deficits but was reported to have a vision cut to the right at this time he is unable to participate with the exam due to the level of sedation he has in the ER.    Imaging the patient has a CT head of the from last night which demonstrates a heterogenous mass in the right parietal region that is difficult to define however there is significant loss of vasogenic edema and entrapment of the right temporal horn.    Assessment and plan  At this time we would recommend placing the patient on some dexamethasone 10 every 6 and followed by for every 6 after 12 hours of the larger dose  Patient should be on Keppra 500 twice daily  MRIs have been ordered he needs an MRI with and without contrast, with stealth protocol in addition added as a separate sequence.    Once this is completed we will be able to  the family as to our recommendations.    He should also have a full oncology work-up with a chest abdomen pelvis with and without contrast for evaluation for possible primary source of metastatic disease.    We will continue to follow and evaluate the patient as imaging is  completed.    Total 50 minutes in direct patient care coordination consultation

## 2022-04-08 NOTE — ED NOTES
Pt seen hitting himself in the head, pt redirected by staff. Pt has been seen talking with another pt, who seems to be able to redirect him. Pt has been complaining of worsening pain and discomfort. Staff apologized to pt for wait times.

## 2022-04-08 NOTE — ED PROVIDER NOTES
ED Provider Note    CHIEF COMPLAINT  Chief Complaint   Patient presents with   • Headache     Pt reports frontal headache for approx 12 hrs, progressively getting worse    • Eye Pain     Pt reports pain behind right eye for approx 12 hrs       HPI  Mark Marcelo is a 72 y.o. male who presents with a headache.  The patient states he had a headache over the last 12 hours.  He states is progressively been worse.  He states that started in the frontal region and is now diffuse.  He states the pain is severe in intensity.  He is unaware of any exacerbating or relieving factors.  He has also developed some pain behind the left eye.  He states he is almost blind he had no change in his vision.  He has not had any associated vomiting.  He states a couple days ago he had a fever but he has not had a fever in the last 24 hours.  Does not have any associated rhinorrhea nor cough.  The patient states that he is vaccinated.  The patient does have a history of optic neuritis but he states this feels different.    REVIEW OF SYSTEMS  See HPI for further details. All other systems are negative.     PAST MEDICAL HISTORY  Past Medical History:   Diagnosis Date   • Pseudophakia of right eye 7/30/2013    Dr Beckham   • Diabetes type 2, uncontrolled (HCC) 7/25/2013    not at goal hisotrically. Currently worse than usual due to prednisone Rx by ophthalmology. Increase meds and RTC 2-3 weeks. Check sugars bid.   • OPTIC NEURITIS 7/25/2013    Continue prednisone taper as per Ophthalmology   • Dyspnea 7/25/2013    suspect COPD and likely MOOKIE. Sats are adequate. Consider overnight oximetry, sleep study. CXR ordered.   • Joint pain 7/25/2013   • Neuropathy 2/16/2013   • Depression     was on cymbalta and abilify.  he was taken off abilify d/t elevated  glucose   • Diabetes    • Hyperlipidemia    • Hypertension associated with diabetes (HCC)    • Insomnia    • Ulcer    • Vitamin d deficiency        FAMILY HISTORY  @[email or web address]@    [employer]  HISTORY  Social History     Socioeconomic History   • Marital status: Single   Tobacco Use   • Smoking status: Former Smoker     Packs/day: 1.00     Years: 40.00     Pack years: 40.00     Types: Cigarettes     Quit date: 2010     Years since quittin.2   • Smokeless tobacco: Never Used   Vaping Use   • Vaping Use: Never used   Substance and Sexual Activity   • Alcohol use: No     Alcohol/week: 0.0 oz     Comment: hx of heavy etoh abuse   • Drug use: No       SURGICAL HISTORY  Past Surgical History:   Procedure Laterality Date   • CATARACT PHACO WITH IOL  13    Dr Beckham   • GASTRIC RESECTION      ulcer bleeding   • ORIF, ANKLE      right   • RETINAL DETACHMENT REPAIR         CURRENT MEDICATIONS  Home Medications     Reviewed by Michelle Weinstein R.N. (Registered Nurse) on 22 at [facility]  Med List Status: Partial   Medication Last Dose Status   Blood Glucose Monitoring Suppl SUPPLIES MISC  Active   ciprofloxacin (CILOXIN) 0.3 % SOLN  Active   fluticasone (FLONASE) 50 MCG/ACT nasal spray  Active   gabapentin (NEURONTIN) 100 MG Cap  Active   gabapentin (NEURONTIN) 300 MG Cap  Active   gemfibrozil (LOPID) 600 MG Tab  Active   glipiZIDE SR (GLUCOTROL) 10 MG TABLET SR 24 HR  Active   hydrOXYzine (ATARAX) 25 MG Tab  Active   ketoconazole (NIZORAL) 2 % Cream  Active   lisinopril (PRINIVIL) 10 MG Tab  Active   lisinopril (PRINIVIL) 20 MG Tab  Active   loratadine (CLARITIN) 10 MG TABS  Active   metformin (GLUCOPHAGE) 1000 MG tablet  Active   olopatadine (PATANOL) 0.1 % ophthalmic solution  Active   omeprazole (PRILOSEC) 20 MG delayed-release capsule  Active   PROAIR  (90 BASE) MCG/ACT AERS  Active   simvastatin (ZOCOR) 20 MG Tab  Active   terbinafine (LAMISIL) 250 MG TABS  Active   tramadol (ULTRAM) 50 MG Tab  Active   vitamin D, Ergocalciferol, (DRISDOL) 41262 UNITS Cap capsule  Active                ALLERGIES  Allergies   Allergen Reactions   • Acetaminophen Vomiting   • Advil [Ibuprofen]   "      PHYSICAL EXAM  VITAL SIGNS: /74   Pulse 76   Temp 36.3 °C (97.4 °F) (Temporal)   Resp 18   Ht 1.676 m (5' 6\")   Wt 84.2 kg (185 lb 10 oz)   SpO2 96%   BMI 29.96 kg/m²       Constitutional: Mild acute distress, Non-toxic appearance.   HENT: Normocephalic, Atraumatic, Bilateral external ears normal, Oropharynx moist, No oral exudates, Nose normal.   Eyes: Pinpoint pupils, EOMI, Conjunctiva normal, No discharge.   Neck: Normal range of motion, No tenderness, Supple, No stridor.   Lymphatic: No lymphadenopathy noted.   Cardiovascular: Normal heart rate, Normal rhythm, No murmurs, No rubs, No gallops.   Thorax & Lungs: Normal breath sounds, No respiratory distress, No wheezing, No chest tenderness.   Abdomen: Bowel sounds normal, Soft, No tenderness, No masses, No pulsatile masses.   Skin: Warm, Dry, No erythema, No rash.   Back: No tenderness, No CVA tenderness.    Extremities: Intact distal pulses, No edema, No tenderness, No cyanosis, No clubbing.   Neurologic: Alert & oriented x 3, Normal motor function, Normal sensory function, No focal deficits noted.   Psychiatric: Affect normal, Judgment normal, Mood normal.     RADIOLOGY/PROCEDURES  CT-HEAD W/O   Final Result         1.  Heterogeneous right parietal mass with associated vasogenic edema resulting in effacement of the posterior right ventricle and 5 mm right to left midline shift. Further characterization with MRI of the brain with contrast recommended.   2.  Atherosclerosis.             Results for orders placed or performed during the hospital encounter of 04/07/22   CBC WITH DIFFERENTIAL   Result Value Ref Range    WBC 9.4 4.8 - 10.8 K/uL    RBC 4.30 (L) 4.70 - 6.10 M/uL    Hemoglobin 12.1 (L) 14.0 - 18.0 g/dL    Hematocrit 36.4 (L) 42.0 - 52.0 %    MCV 84.7 81.4 - 97.8 fL    MCH 28.1 27.0 - 33.0 pg    MCHC 33.2 (L) 33.7 - 35.3 g/dL    RDW 43.6 35.9 - 50.0 fL    Platelet Count 278 164 - 446 K/uL    MPV 9.1 9.0 - 12.9 fL    Neutrophils-Polys " 63.90 44.00 - 72.00 %    Lymphocytes 26.30 22.00 - 41.00 %    Monocytes 7.00 0.00 - 13.40 %    Eosinophils 1.60 0.00 - 6.90 %    Basophils 0.80 0.00 - 1.80 %    Immature Granulocytes 0.40 0.00 - 0.90 %    Nucleated RBC 0.00 /100 WBC    Neutrophils (Absolute) 6.01 1.82 - 7.42 K/uL    Lymphs (Absolute) 2.48 1.00 - 4.80 K/uL    Monos (Absolute) 0.66 0.00 - 0.85 K/uL    Eos (Absolute) 0.15 0.00 - 0.51 K/uL    Baso (Absolute) 0.08 0.00 - 0.12 K/uL    Immature Granulocytes (abs) 0.04 0.00 - 0.11 K/uL    NRBC (Absolute) 0.00 K/uL   COMP METABOLIC PANEL   Result Value Ref Range    Sodium 136 135 - 145 mmol/L    Potassium 3.8 3.6 - 5.5 mmol/L    Chloride 102 96 - 112 mmol/L    Co2 21 20 - 33 mmol/L    Anion Gap 13.0 7.0 - 16.0    Glucose 148 (H) 65 - 99 mg/dL    Bun 26 (H) 8 - 22 mg/dL    Creatinine 1.01 0.50 - 1.40 mg/dL    Calcium 9.2 8.5 - 10.5 mg/dL    AST(SGOT) 20 12 - 45 U/L    ALT(SGPT) 15 2 - 50 U/L    Alkaline Phosphatase 106 (H) 30 - 99 U/L    Total Bilirubin 1.2 0.1 - 1.5 mg/dL    Albumin 4.4 3.2 - 4.9 g/dL    Total Protein 7.5 6.0 - 8.2 g/dL    Globulin 3.1 1.9 - 3.5 g/dL    A-G Ratio 1.4 g/dL   CRP QUANTITIVE (NON-CARDIAC)   Result Value Ref Range    Stat C-Reactive Protein 0.36 0.00 - 0.75 mg/dL   ESTIMATED GFR   Result Value Ref Range    GFR (CKD-EPI) 79 >60 mL/min/1.73 m 2       COURSE & MEDICAL DECISION MAKING  Pertinent Labs & Imaging studies reviewed. (See chart for details)  This a 72-year-old male who presents emerged part with a headache and eye pain.  I did check his intraocular pressure on the left and it was 17.  Unfortunately CT scan does show a right parietal mass and I suspect this is the cause of his presenting symptoms.  He is neurologically intact.  He has not had any visual changes.  The patient will be admitted to the hospitalist with neurosurgery in consultation.    FINAL IMPRESSION  1.  Right parietal mass    Disposition  The patient will be admitted in guarded condition      Electronically  signed by: Conor Freitas M.D., 4/7/2022 9:09 PM

## 2022-04-09 LAB
ALBUMIN SERPL BCP-MCNC: 4.1 G/DL (ref 3.2–4.9)
BASOPHILS # BLD AUTO: 0.2 % (ref 0–1.8)
BASOPHILS # BLD: 0.02 K/UL (ref 0–0.12)
BUN SERPL-MCNC: 41 MG/DL (ref 8–22)
CALCIUM SERPL-MCNC: 9 MG/DL (ref 8.5–10.5)
CHLORIDE SERPL-SCNC: 101 MMOL/L (ref 96–112)
CO2 SERPL-SCNC: 21 MMOL/L (ref 20–33)
CREAT SERPL-MCNC: 0.93 MG/DL (ref 0.5–1.4)
EOSINOPHIL # BLD AUTO: 0 K/UL (ref 0–0.51)
EOSINOPHIL NFR BLD: 0 % (ref 0–6.9)
ERYTHROCYTE [DISTWIDTH] IN BLOOD BY AUTOMATED COUNT: 44.3 FL (ref 35.9–50)
GFR SERPLBLD CREATININE-BSD FMLA CKD-EPI: 87 ML/MIN/1.73 M 2
GLUCOSE BLD STRIP.AUTO-MCNC: 273 MG/DL (ref 65–99)
GLUCOSE BLD STRIP.AUTO-MCNC: 292 MG/DL (ref 65–99)
GLUCOSE BLD STRIP.AUTO-MCNC: 295 MG/DL (ref 65–99)
GLUCOSE BLD STRIP.AUTO-MCNC: 296 MG/DL (ref 65–99)
GLUCOSE BLD STRIP.AUTO-MCNC: 323 MG/DL (ref 65–99)
GLUCOSE SERPL-MCNC: 262 MG/DL (ref 65–99)
HCT VFR BLD AUTO: 35.2 % (ref 42–52)
HGB BLD-MCNC: 11.6 G/DL (ref 14–18)
IMM GRANULOCYTES # BLD AUTO: 0.1 K/UL (ref 0–0.11)
IMM GRANULOCYTES NFR BLD AUTO: 0.9 % (ref 0–0.9)
LYMPHOCYTES # BLD AUTO: 1.58 K/UL (ref 1–4.8)
LYMPHOCYTES NFR BLD: 13.9 % (ref 22–41)
MAGNESIUM SERPL-MCNC: 2 MG/DL (ref 1.5–2.5)
MCH RBC QN AUTO: 28.2 PG (ref 27–33)
MCHC RBC AUTO-ENTMCNC: 33 G/DL (ref 33.7–35.3)
MCV RBC AUTO: 85.4 FL (ref 81.4–97.8)
MONOCYTES # BLD AUTO: 0.51 K/UL (ref 0–0.85)
MONOCYTES NFR BLD AUTO: 4.5 % (ref 0–13.4)
NEUTROPHILS # BLD AUTO: 9.18 K/UL (ref 1.82–7.42)
NEUTROPHILS NFR BLD: 80.5 % (ref 44–72)
NRBC # BLD AUTO: 0 K/UL
NRBC BLD-RTO: 0 /100 WBC
PHOSPHATE SERPL-MCNC: 3.1 MG/DL (ref 2.5–4.5)
PLATELET # BLD AUTO: 285 K/UL (ref 164–446)
PMV BLD AUTO: 9.4 FL (ref 9–12.9)
POTASSIUM SERPL-SCNC: 4.5 MMOL/L (ref 3.6–5.5)
RBC # BLD AUTO: 4.12 M/UL (ref 4.7–6.1)
SODIUM SERPL-SCNC: 135 MMOL/L (ref 135–145)
WBC # BLD AUTO: 11.4 K/UL (ref 4.8–10.8)

## 2022-04-09 PROCEDURE — 770001 HCHG ROOM/CARE - MED/SURG/GYN PRIV*

## 2022-04-09 PROCEDURE — 99233 SBSQ HOSP IP/OBS HIGH 50: CPT | Performed by: HOSPITALIST

## 2022-04-09 PROCEDURE — 700102 HCHG RX REV CODE 250 W/ 637 OVERRIDE(OP): Performed by: HOSPITALIST

## 2022-04-09 PROCEDURE — 80069 RENAL FUNCTION PANEL: CPT

## 2022-04-09 PROCEDURE — 83735 ASSAY OF MAGNESIUM: CPT

## 2022-04-09 PROCEDURE — 700111 HCHG RX REV CODE 636 W/ 250 OVERRIDE (IP): Performed by: INTERNAL MEDICINE

## 2022-04-09 PROCEDURE — 700102 HCHG RX REV CODE 250 W/ 637 OVERRIDE(OP): Performed by: INTERNAL MEDICINE

## 2022-04-09 PROCEDURE — 51798 US URINE CAPACITY MEASURE: CPT

## 2022-04-09 PROCEDURE — 36415 COLL VENOUS BLD VENIPUNCTURE: CPT

## 2022-04-09 PROCEDURE — A9270 NON-COVERED ITEM OR SERVICE: HCPCS | Performed by: INTERNAL MEDICINE

## 2022-04-09 PROCEDURE — 85025 COMPLETE CBC W/AUTO DIFF WBC: CPT

## 2022-04-09 PROCEDURE — 82962 GLUCOSE BLOOD TEST: CPT

## 2022-04-09 RX ADMIN — DEXAMETHASONE SODIUM PHOSPHATE 4 MG: 4 INJECTION, SOLUTION INTRA-ARTICULAR; INTRALESIONAL; INTRAMUSCULAR; INTRAVENOUS; SOFT TISSUE at 17:13

## 2022-04-09 RX ADMIN — LORAZEPAM 1 MG: 2 INJECTION INTRAMUSCULAR; INTRAVENOUS at 22:36

## 2022-04-09 RX ADMIN — FLUOXETINE 20 MG: 20 CAPSULE ORAL at 05:09

## 2022-04-09 RX ADMIN — SENNOSIDES AND DOCUSATE SODIUM 2 TABLET: 50; 8.6 TABLET ORAL at 17:12

## 2022-04-09 RX ADMIN — HALOPERIDOL LACTATE 1 MG: 5 INJECTION, SOLUTION INTRAMUSCULAR at 15:48

## 2022-04-09 RX ADMIN — GABAPENTIN 1200 MG: 400 CAPSULE ORAL at 05:09

## 2022-04-09 RX ADMIN — ATORVASTATIN CALCIUM 10 MG: 10 TABLET, FILM COATED ORAL at 20:56

## 2022-04-09 RX ADMIN — HALOPERIDOL LACTATE 1 MG: 5 INJECTION, SOLUTION INTRAMUSCULAR at 20:56

## 2022-04-09 RX ADMIN — DEXAMETHASONE SODIUM PHOSPHATE 4 MG: 4 INJECTION, SOLUTION INTRA-ARTICULAR; INTRALESIONAL; INTRAMUSCULAR; INTRAVENOUS; SOFT TISSUE at 05:09

## 2022-04-09 RX ADMIN — LISINOPRIL 20 MG: 20 TABLET ORAL at 05:08

## 2022-04-09 RX ADMIN — HALOPERIDOL LACTATE 1 MG: 5 INJECTION, SOLUTION INTRAMUSCULAR at 05:09

## 2022-04-09 RX ADMIN — LEVETIRACETAM 500 MG: 500 TABLET, FILM COATED ORAL at 05:08

## 2022-04-09 RX ADMIN — DEXAMETHASONE SODIUM PHOSPHATE 4 MG: 4 INJECTION, SOLUTION INTRA-ARTICULAR; INTRALESIONAL; INTRAMUSCULAR; INTRAVENOUS; SOFT TISSUE at 12:17

## 2022-04-09 RX ADMIN — GABAPENTIN 1200 MG: 400 CAPSULE ORAL at 17:13

## 2022-04-09 RX ADMIN — LORAZEPAM 1 MG: 2 INJECTION INTRAMUSCULAR; INTRAVENOUS at 03:39

## 2022-04-09 RX ADMIN — LEVETIRACETAM 500 MG: 500 TABLET, FILM COATED ORAL at 17:13

## 2022-04-09 RX ADMIN — INSULIN GLARGINE 10 UNITS: 100 INJECTION, SOLUTION SUBCUTANEOUS at 17:22

## 2022-04-09 RX ADMIN — DEXAMETHASONE SODIUM PHOSPHATE 4 MG: 4 INJECTION, SOLUTION INTRA-ARTICULAR; INTRALESIONAL; INTRAMUSCULAR; INTRAVENOUS; SOFT TISSUE at 00:21

## 2022-04-09 RX ADMIN — LORAZEPAM 1 MG: 2 INJECTION INTRAMUSCULAR; INTRAVENOUS at 01:36

## 2022-04-09 ASSESSMENT — COGNITIVE AND FUNCTIONAL STATUS - GENERAL
WALKING IN HOSPITAL ROOM: A LITTLE
DAILY ACTIVITIY SCORE: 22
SUGGESTED CMS G CODE MODIFIER MOBILITY: CJ
EATING MEALS: A LITTLE
DRESSING REGULAR LOWER BODY CLOTHING: A LITTLE
SUGGESTED CMS G CODE MODIFIER DAILY ACTIVITY: CJ
CLIMB 3 TO 5 STEPS WITH RAILING: A LOT
MOBILITY SCORE: 21

## 2022-04-09 ASSESSMENT — PAIN DESCRIPTION - PAIN TYPE: TYPE: ACUTE PAIN

## 2022-04-09 NOTE — PROGRESS NOTES
4 Eyes Skin Assessment Completed by SUE Alvarez and SUE Edwards.    Head WDL  Ears WDL  Nose WDL  Mouth WDL  Neck WDL  Breast/Chest WDL  Shoulder Blades WDL  Spine WDL  (R) Arm/Elbow/Hand WDL  (L) Arm/Elbow/Hand WDL  Abdomen WDL  Groin WDL  Scrotum/Coccyx/Buttocks WDL  (R) Leg Abrasion  (L) Leg Abrasion  (R) Heel/Foot/Toe WDL  (L) Heel/Foot/Toe WDL          Devices In Places Pulse Ox      Interventions In Place N/A    Possible Skin Injury No    Pictures Uploaded Into Epic Yes  Wound Consult Placed N/A  RN Wound Prevention Protocol Ordered No

## 2022-04-09 NOTE — PROGRESS NOTES
Neurosurgery Progress Note    Subjective:  Pt in chair at nursing station d/t his attempts to get oob    Exam:  Sleeping and a bit sedated. Does OE's to stimuli, mumbles (had food in mouth), no fc's for me, but apparently georges's    BP  Min: 99/59  Max: 130/95  Pulse  Av.3  Min: 70  Max: 92  Resp  Av.2  Min: 16  Max: 23  Temp  Av.3 °C (97.3 °F)  Min: 36.1 °C (96.9 °F)  Max: 36.6 °C (97.8 °F)  SpO2  Av.8 %  Min: 84 %  Max: 100 %    No data recorded    Recent Labs     22  0424   WBC 9.4 11.4*   RBC 4.30* 4.12*   HEMOGLOBIN 12.1* 11.6*   HEMATOCRIT 36.4* 35.2*   MCV 84.7 85.4   MCH 28.1 28.2   MCHC 33.2* 33.0*   RDW 43.6 44.3   PLATELETCT 278 285   MPV 9.1 9.4     Recent Labs     22  0424   SODIUM 136 135   POTASSIUM 3.8 4.5   CHLORIDE 102 101   CO2 21 21   GLUCOSE 148* 262*   BUN 26* 41*   CREATININE 1.01 0.93   CALCIUM 9.2 9.0               Intake/Output                       22 - 22 - 04/10/22 0659      Total  Total                 Intake    Total Intake -- -- -- -- -- --       Output    Urine  200  -- 200  --  -- --    Number of Times Voided -- 3 x 3 x -- -- --    Urine Void (mL) 200 -- 200 -- -- --    Total Output 200 -- 200 -- -- --       Net I/O     -200 -- -200 -- -- --            Intake/Output Summary (Last 24 hours) at 2022 0839  Last data filed at 2022 1644  Gross per 24 hour   Intake --   Output 200 ml   Net -200 ml       $ Bladder Scan Results (mL): 178 (post void)    • atorvastatin  10 mg Nightly   • FLUoxetine  20 mg DAILY   • lisinopril  20 mg DAILY   • omeprazole  40 mg DAILY   • levETIRAcetam  500 mg BID   • haloperidol lactate  1 mg Q8HRS   • enoxaparin (LOVENOX) injection  40 mg DAILY   • dexamethasone  4 mg Q6HRS   • insulin regular  1-6 Units 4X/DAY ACHS    And   • dextrose bolus  25 g Q15 MIN PRN   • gabapentin  1,200 mg BID   • albuterol  2 Puff Q6HRS PRN    • traMADol  50 mg Q12HRS PRN   • senna-docusate  2 Tablet BID    And   • polyethylene glycol/lytes  1 Packet QDAY PRN    And   • magnesium hydroxide  30 mL QDAY PRN    And   • bisacodyl  10 mg QDAY PRN   • Pharmacy Consult Request  1 Each PHARMACY TO DOSE   • oxyCODONE immediate-release  2.5 mg Q3HRS PRN    Or   • oxyCODONE immediate-release  5 mg Q3HRS PRN    Or   • HYDROmorphone  0.25 mg Q3HRS PRN   • hydrALAZINE  10 mg Q4HRS PRN   • LORazepam  1 mg Q2HRS PRN       Assessment and Plan:  POD # 0 Heterogenous mass in the right parietal region  Nm as above  MRI's completed 4/8/22  Continue Decadron/ Keppra  Pt on Lovenox per IM  Prophylactic anticoagulation: yes         Start date/time: per IM    Surgical plan per Dr. Lewis

## 2022-04-09 NOTE — PROGRESS NOTES
[facility] Medicine Daily Progress Note    Date of Service  4/9/2022    Chief Complaint  Mark Marcelo is a 72 y.o. male admitted 4/7/2022 with headache.     Hospital Course  Mark Marcelo is a 72 y.o. male with history of diabetes, hypertension, dyslipidemia who presented 4/7/2022 with 12 hours of worsening headache prompting evaluation emergency department where he is found to have a right temporal mass concerning for glioblastoma    Interval Problem Update  Patient was seen and examined at bedside.      A&O x0, opens eyes to voice, follows commands  Agitation improved overnight.  Now more stuporous than delirious.  Remains noncontributory to subjective exam.  Await MRI brain  Neurosurgery recs  Keppra 500mg BID    I have personally seen and examined the patient at bedside. I discussed the plan of care with patient, bedside RN and neurosurgery.    Consultants/Specialty  neurosurgery    Code Status  Full Code    Disposition  Patient is not medically cleared for discharge.   Anticipate discharge to to home with close outpatient follow-up.  I have placed the appropriate orders for post-discharge needs.    Review of Systems  Unable to be obtained secondary to clinical condition    Physical Exam  Temp:  [36 °C (96.8 °F)-36.6 °C (97.8 °F)] 36 °C (96.8 °F)  Pulse:  [70-92] 72  Resp:  [16-18] 16  BP: (102-130)/(54-95) 108/54  SpO2:  [91 %-100 %] 91 %  General appearance: NAD, delirious, not responding appropriately.  Neck: FROM, supple  Lungs: Clear to auscultation  CV: RRR, no MRGs; normal carotid upstroke and amplitude without bruits  Abdomen: Soft, non-tender; no masses or HSM  Extremities: No peripheral edema or digital cyanosis  Skin: no rash, lesions or ulcers  Psych: Unable to be assessed.      Current Facility-Administered Medications:   •  insulin GLARGINE (Lantus,Semglee) injection, 10 Units, Subcutaneous, Q EVENING, Marcelino Flores M.D.  •  atorvastatin (LIPITOR) tablet 10 mg, 10 mg, Oral, Nightly, Asael E Bird,  D.O., 10 mg at 04/08/22 2048  •  FLUoxetine (PROZAC) capsule 20 mg, 20 mg, Oral, DAILY, Asael Small D.O., 20 mg at 04/09/22 0509  •  lisinopril (PRINIVIL) tablet 20 mg, 20 mg, Oral, DAILY, TYRONE Smith.O., 20 mg at 04/09/22 0508  •  omeprazole (PRILOSEC) capsule 40 mg, 40 mg, Oral, DAILY, TYRONE Smith.O., 40 mg at 04/08/22 1408  •  levETIRAcetam (KEPPRA) tablet 500 mg, 500 mg, Oral, BID, TYRONE Smith.O., 500 mg at 04/09/22 0508  •  haloperidol lactate (HALDOL) injection 1 mg, 1 mg, Intravenous, Q8HRS, TYRONE Smith.O., 1 mg at 04/09/22 0509  •  enoxaparin (LOVENOX) inj 40 mg, 40 mg, Subcutaneous, DAILY, TYRONE Smith.O.  •  dexamethasone (DECADRON) injection 4 mg, 4 mg, Intravenous, Q6HRS, TYRONE Smith.O., 4 mg at 04/09/22 1217  •  insulin regular (HumuLIN R,NovoLIN R) injection, 1-6 Units, Subcutaneous, 4X/DAY ACHS, 4 Units at 04/09/22 1215 **AND** POC blood glucose manual result, , , Q AC AND BEDTIME(S) **AND** [COMPLETED] NOTIFY MD and PharmD, , , Once **AND** Administer 20 grams of glucose (approximately 8 ounces of fruit juice) every 15 minutes PRN FSBG less than 70 mg/dL, , , PRN **AND** dextrose 50% (D50W) injection 25 g, 25 g, Intravenous, Q15 MIN PRN, TYRONE Smith.O.  •  gabapentin (NEURONTIN) capsule 1,200 mg, 1,200 mg, Oral, BID, Noble Salinas M.D., 1,200 mg at 04/09/22 0509  •  albuterol inhaler 2 Puff, 2 Puff, Inhalation, Q6HRS PRN, Noble Salinas M.D.  •  traMADol (ULTRAM) 50 MG tablet 50 mg, 50 mg, Oral, Q12HRS PRN, Noble Salinas M.D.  •  senna-docusate (PERICOLACE or SENOKOT S) 8.6-50 MG per tablet 2 Tablet, 2 Tablet, Oral, BID, 2 Tablet at 04/08/22 1800 **AND** polyethylene glycol/lytes (MIRALAX) PACKET 1 Packet, 1 Packet, Oral, QDAY PRN **AND** magnesium hydroxide (MILK OF MAGNESIA) suspension 30 mL, 30 mL, Oral, QDAY PRN **AND** bisacodyl (DULCOLAX) suppository 10 mg, 10 mg, Rectal, QDAY PRN, Noble Salinas M.D.  •  [COMPLETED] Notify provider if pain  remains uncontrolled, , , CONTINUOUS **AND** [COMPLETED] Use the Numeric Rating Scale (NRS), Floyd-Baker Faces (WBF), or FLACC on regular floors and Critical-Care Pain Observation Tool (CPOT) on ICUs/Trauma to assess pain, , , CONTINUOUS **AND** [COMPLETED] Pulse Ox, , , CONTINUOUS **AND** Pharmacy Consult Request ...Pain Management Review 1 Each, 1 Each, Other, PHARMACY TO DOSE **AND** [COMPLETED] If patient difficult to arouse and/or has respiratory depression (respiratory rate of 10 or less), stop any opiates that are currently infusing and call a Rapid Response., , , CONTINUOUS, Noble Salinas M.D.  •  oxyCODONE immediate-release (ROXICODONE) tablet 2.5 mg, 2.5 mg, Oral, Q3HRS PRN **OR** oxyCODONE immediate-release (ROXICODONE) tablet 5 mg, 5 mg, Oral, Q3HRS PRN **OR** HYDROmorphone (Dilaudid) injection 0.25 mg, 0.25 mg, Intravenous, Q3HRS PRN, Noble Salinas M.D.  •  hydrALAZINE (APRESOLINE) injection 10 mg, 10 mg, Intravenous, Q4HRS PRN, Noble Salinas M.D.  •  LORazepam (ATIVAN) injection 1 mg, 1 mg, Intravenous, Q2HRS PRN, Noble Salinas M.D., 1 mg at 04/09/22 0339      Fluids    Intake/Output Summary (Last 24 hours) at 4/9/2022 1533  Last data filed at 4/8/2022 1644  Gross per 24 hour   Intake --   Output 200 ml   Net -200 ml       Laboratory  Recent Labs     04/07/22 2131 04/09/22 0424   WBC 9.4 11.4*   RBC 4.30* 4.12*   HEMOGLOBIN 12.1* 11.6*   HEMATOCRIT 36.4* 35.2*   MCV 84.7 85.4   MCH 28.1 28.2   MCHC 33.2* 33.0*   RDW 43.6 44.3   PLATELETCT 278 285   MPV 9.1 9.4     Recent Labs     04/07/22 2131 04/09/22  0424   SODIUM 136 135   POTASSIUM 3.8 4.5   CHLORIDE 102 101   CO2 21 21   GLUCOSE 148* 262*   BUN 26* 41*   CREATININE 1.01 0.93   CALCIUM 9.2 9.0                   Imaging  CT-HEAD W/O   Final Result         1.  Heterogeneous right parietal mass with associated vasogenic edema resulting in effacement of the posterior right ventricle and 5 mm right to left midline shift. Further  characterization with MRI of the brain with contrast recommended.   2.  Atherosclerosis.         MR-STEALTH BRAIN WITH & W/O    (Results Pending)   MR-BRAIN-WITH & W/O    (Results Pending)        Assessment/Plan  * Brain mass- (present on admission)  Assessment & Plan  Complicated by vasogenic edema and a 5 mm right to left shift.   Visual field compromise  Dexamethasone 10mg q6 x12 hours  Then dexamethasone 4mg q6   Keppra 500mg BID  Con't to await MRI brain   Neurosurgery recs    Acute respiratory failure with hypoxia (HCC)  Assessment & Plan  Possibly sequelae of sedation  SpO2 84% on room air  Supplemental oxygen to maintain SpO2 >92%  monitor    Toxic metabolic encephalopathy  Assessment & Plan  Secondary to brain mass  Ativan 1 mg x 2, soft wrist restraints.  Haldol 1mg IV every 8 hours     Diabetes type 2, uncontrolled (HCC)- (present on admission)  Assessment & Plan  Regular insulin sliding scale every 6 hours as needed.  Lantus added 4/9, CTM, titrate daily to goal bg     Depression- (present on admission)  Assessment & Plan  Home SSRI       VTE prophylaxis: SCDs/TEDs and enoxaparin ppx    I have performed a physical exam and reviewed and updated ROS and Plan today (4/9/2022). In review of yesterday's note (4/8/2022), there are no changes except as documented above.

## 2022-04-09 NOTE — PROGRESS NOTES
"Pt had a fall @0015, Pt was impulsive starting at the beginning of shift. Pt is A&Ox4 and educated on fall risk. Pt was very uncomfortable in his bed, continualy asking for a new bed. This RN explained that the bed he has is the best bed we can offer him as well as gave all comfort measures available multiple times. Got pt up to a recliner with treaded slipper socks, chair alarm, brakes on, and room door open and next to nurses station. Pt was situated with his call light and this RN stated that I was going to be right back with his meds and left the room. Less than a minute later while I was at the [facility] pt had slipped off the recliner and onto the floor stating \"I was trying to turn on my side and I don't know what happened\". Pt landed on his butt onto pillows, no injuries.  "

## 2022-04-09 NOTE — DISCHARGE PLANNING
Anticipated Discharge Disposition:   TBD    Action:    Pt is lethargic, oriented to self, sitting semi-gold's in hospital bed.  Significant other, Jose provided information.  Pt receiving O2 3LNC.  Jose stated that he and patient have been together for 42 years.  They live in [place] and drove their RV here for vacation.  Pt used to live in [place] and wanted to come here to find a place to move to in [place].  He is tired of [place].  Jose indicated that patient has been deteriorating over the past 2 weeks and he has been helping him with bathing, dressing, medications.  Pt uses a cane.  He has even had some depression SI over the past 2 days per Jose.  Pts family is in [place] and [facility] per Jose.  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  He has a  in  named Noble [phone].  Jose is planning on traveling back to  soon as he needs to attend to various matters.  Pt is disoriented and does not have a medical decision maker.  Recommend ethics consult as s/o has been with patient x 42 years.      [facility] search request:          Barriers to Discharge:    Medical clearance    Plan:    Call [facility].  Provide transitional care coordination.  Care Transition Team Assessment    Information Source  Orientation Level: Oriented to person  Information Given By: Patient,Significant Other  Informant's Name: Jose Valenzuela    Readmission Evaluation  Is this a readmission?: No              Discharge Preparedness  What is your plan after discharge?: Uncertain - pending medical team collaboration  What are your discharge supports?: Other (comment)  Prior Functional Level: Ambulatory,Needs Assist with Activities of Daily Living,Needs Assist with Medication Management,Uses Cane  Difficulity with ADLs: Bathing,Brushing teeth,Dressing,Eating,Toileting,Walking  Difficulity with IADLs: Cooking,Driving,Keeping track of finances,Laundry,Managing medication,Shopping,Using the telephone or computer    Functional Assesment  Prior Functional Level:  Ambulatory,Needs Assist with Activities of Daily Living,Needs Assist with Medication Management,Uses Cane    Finances  Financial Barriers to Discharge: No  Prescription Coverage: Yes                   Domestic Abuse  Have you ever been the victim of abuse or violence?: No         Discharge Risks or Barriers  Discharge risks or barriers?: Complex medical needs  Patient risk factors: Cognitive / sensory / physical deficit,Complex medical needs    Anticipated Discharge Information  Discharge Disposition: D/T to SNF with Medicare cert in anticipation of skilled care (03)  Discharge Contact Phone Number: [phone]

## 2022-04-10 LAB
GLUCOSE BLD STRIP.AUTO-MCNC: 292 MG/DL (ref 65–99)
GLUCOSE BLD STRIP.AUTO-MCNC: 325 MG/DL (ref 65–99)
GLUCOSE BLD STRIP.AUTO-MCNC: 330 MG/DL (ref 65–99)
GLUCOSE BLD STRIP.AUTO-MCNC: 344 MG/DL (ref 65–99)

## 2022-04-10 PROCEDURE — 700111 HCHG RX REV CODE 636 W/ 250 OVERRIDE (IP): Performed by: INTERNAL MEDICINE

## 2022-04-10 PROCEDURE — C9803 HOPD COVID-19 SPEC COLLECT: HCPCS | Performed by: HOSPITALIST

## 2022-04-10 PROCEDURE — 99233 SBSQ HOSP IP/OBS HIGH 50: CPT | Performed by: HOSPITALIST

## 2022-04-10 PROCEDURE — 770001 HCHG ROOM/CARE - MED/SURG/GYN PRIV*

## 2022-04-10 PROCEDURE — 700102 HCHG RX REV CODE 250 W/ 637 OVERRIDE(OP): Performed by: INTERNAL MEDICINE

## 2022-04-10 PROCEDURE — 82962 GLUCOSE BLOOD TEST: CPT | Mod: 91

## 2022-04-10 PROCEDURE — A9270 NON-COVERED ITEM OR SERVICE: HCPCS | Performed by: INTERNAL MEDICINE

## 2022-04-10 PROCEDURE — 0240U HCHG SARS-COV-2 COVID-19 NFCT DS RESP RNA 3 TRGT MIC: CPT

## 2022-04-10 PROCEDURE — 700102 HCHG RX REV CODE 250 W/ 637 OVERRIDE(OP): Performed by: HOSPITALIST

## 2022-04-10 RX ORDER — METOCLOPRAMIDE HYDROCHLORIDE 5 MG/ML
10 INJECTION INTRAMUSCULAR; INTRAVENOUS EVERY 4 HOURS PRN
Status: DISCONTINUED | OUTPATIENT
Start: 2022-04-10 | End: 2022-04-14 | Stop reason: HOSPADM

## 2022-04-10 RX ORDER — NICOTINE 21 MG/24HR
14 PATCH, TRANSDERMAL 24 HOURS TRANSDERMAL
Status: DISCONTINUED | OUTPATIENT
Start: 2022-04-10 | End: 2022-04-10

## 2022-04-10 RX ORDER — CHLORPROMAZINE HYDROCHLORIDE 10 MG/1
10 TABLET, FILM COATED ORAL 3 TIMES DAILY PRN
Status: DISCONTINUED | OUTPATIENT
Start: 2022-04-10 | End: 2022-04-10

## 2022-04-10 RX ORDER — DEXTROSE MONOHYDRATE 25 G/50ML
25 INJECTION, SOLUTION INTRAVENOUS
Status: DISCONTINUED | OUTPATIENT
Start: 2022-04-10 | End: 2022-04-14 | Stop reason: HOSPADM

## 2022-04-10 RX ORDER — INSULIN LISPRO 100 [IU]/ML
2-9 INJECTION, SOLUTION INTRAVENOUS; SUBCUTANEOUS
Status: DISCONTINUED | OUTPATIENT
Start: 2022-04-10 | End: 2022-04-14 | Stop reason: HOSPADM

## 2022-04-10 RX ORDER — IBUPROFEN 600 MG/1
600 TABLET ORAL EVERY 6 HOURS PRN
Status: DISCONTINUED | OUTPATIENT
Start: 2022-04-10 | End: 2022-04-10

## 2022-04-10 RX ORDER — INSULIN LISPRO 100 [IU]/ML
0.2 INJECTION, SOLUTION INTRAVENOUS; SUBCUTANEOUS
Status: DISCONTINUED | OUTPATIENT
Start: 2022-04-10 | End: 2022-04-14 | Stop reason: HOSPADM

## 2022-04-10 RX ADMIN — OMEPRAZOLE 40 MG: 20 CAPSULE, DELAYED RELEASE ORAL at 05:45

## 2022-04-10 RX ADMIN — LISINOPRIL 20 MG: 20 TABLET ORAL at 05:45

## 2022-04-10 RX ADMIN — DEXAMETHASONE SODIUM PHOSPHATE 4 MG: 4 INJECTION, SOLUTION INTRA-ARTICULAR; INTRALESIONAL; INTRAMUSCULAR; INTRAVENOUS; SOFT TISSUE at 11:04

## 2022-04-10 RX ADMIN — DEXAMETHASONE SODIUM PHOSPHATE 4 MG: 4 INJECTION, SOLUTION INTRA-ARTICULAR; INTRALESIONAL; INTRAMUSCULAR; INTRAVENOUS; SOFT TISSUE at 17:06

## 2022-04-10 RX ADMIN — LORAZEPAM 1 MG: 2 INJECTION INTRAMUSCULAR; INTRAVENOUS at 06:33

## 2022-04-10 RX ADMIN — HALOPERIDOL LACTATE 1 MG: 5 INJECTION, SOLUTION INTRAMUSCULAR at 15:50

## 2022-04-10 RX ADMIN — ENOXAPARIN SODIUM 40 MG: 40 INJECTION SUBCUTANEOUS at 05:44

## 2022-04-10 RX ADMIN — GABAPENTIN 1200 MG: 400 CAPSULE ORAL at 05:44

## 2022-04-10 RX ADMIN — INSULIN LISPRO 6 UNITS: 100 INJECTION, SOLUTION INTRAVENOUS; SUBCUTANEOUS at 16:59

## 2022-04-10 RX ADMIN — INSULIN LISPRO 5 UNITS: 100 INJECTION, SOLUTION INTRAVENOUS; SUBCUTANEOUS at 21:13

## 2022-04-10 RX ADMIN — INSULIN LISPRO 6 UNITS: 100 INJECTION, SOLUTION INTRAVENOUS; SUBCUTANEOUS at 17:02

## 2022-04-10 RX ADMIN — FLUOXETINE 20 MG: 20 CAPSULE ORAL at 05:45

## 2022-04-10 RX ADMIN — LORAZEPAM 1 MG: 2 INJECTION INTRAMUSCULAR; INTRAVENOUS at 22:48

## 2022-04-10 RX ADMIN — HALOPERIDOL LACTATE 1 MG: 5 INJECTION, SOLUTION INTRAMUSCULAR at 22:00

## 2022-04-10 RX ADMIN — LEVETIRACETAM 500 MG: 500 TABLET, FILM COATED ORAL at 05:45

## 2022-04-10 RX ADMIN — LORAZEPAM 1 MG: 2 INJECTION INTRAMUSCULAR; INTRAVENOUS at 03:28

## 2022-04-10 RX ADMIN — HALOPERIDOL LACTATE 1 MG: 5 INJECTION, SOLUTION INTRAMUSCULAR at 06:33

## 2022-04-10 RX ADMIN — DEXAMETHASONE SODIUM PHOSPHATE 4 MG: 4 INJECTION, SOLUTION INTRA-ARTICULAR; INTRALESIONAL; INTRAMUSCULAR; INTRAVENOUS; SOFT TISSUE at 00:41

## 2022-04-10 RX ADMIN — ATORVASTATIN CALCIUM 10 MG: 10 TABLET, FILM COATED ORAL at 21:20

## 2022-04-10 RX ADMIN — DEXAMETHASONE SODIUM PHOSPHATE 4 MG: 4 INJECTION, SOLUTION INTRA-ARTICULAR; INTRALESIONAL; INTRAMUSCULAR; INTRAVENOUS; SOFT TISSUE at 06:33

## 2022-04-10 RX ADMIN — LEVETIRACETAM 500 MG: 500 TABLET, FILM COATED ORAL at 17:07

## 2022-04-10 RX ADMIN — LORAZEPAM 1 MG: 2 INJECTION INTRAMUSCULAR; INTRAVENOUS at 00:40

## 2022-04-10 RX ADMIN — LORAZEPAM 1 MG: 2 INJECTION INTRAMUSCULAR; INTRAVENOUS at 20:47

## 2022-04-10 ASSESSMENT — PAIN DESCRIPTION - PAIN TYPE: TYPE: ACUTE PAIN

## 2022-04-10 NOTE — PROGRESS NOTES
Neurosurgery Progress Note    Subjective:  Denies HA    Exam:  More awake, some Georgian/english  Oriented to self and time, confused to situation/place  Tongue ML, No drift, FCx4    BP  Min: 102/61  Max: 119/60  Pulse  Av.5  Min: 66  Max: 74  Resp  Av.8  Min: 16  Max: 19  Temp  Av.1 °C (97 °F)  Min: 36.1 °C (96.9 °F)  Max: 36.3 °C (97.3 °F)  SpO2  Av.5 %  Min: 96 %  Max: 97 %    No data recorded    Recent Labs     22  0424   WBC 9.4 11.4*   RBC 4.30* 4.12*   HEMOGLOBIN 12.1* 11.6*   HEMATOCRIT 36.4* 35.2*   MCV 84.7 85.4   MCH 28.1 28.2   MCHC 33.2* 33.0*   RDW 43.6 44.3   PLATELETCT 278 285   MPV 9.1 9.4     Recent Labs     22  0424   SODIUM 136 135   POTASSIUM 3.8 4.5   CHLORIDE 102 101   CO2 21 21   GLUCOSE 148* 262*   BUN 26* 41*   CREATININE 1.01 0.93   CALCIUM 9.2 9.0               Intake/Output                       22 - 04/10/22 0659 04/10/22 0700 - 22 Total  Total                 Intake    P.O.  --  -- --  300  -- 300    P.O. -- -- -- 300 -- 300    Total Intake -- -- -- 300 -- 300       Output    Urine  --  200 200  --  -- --    Number of Times Voided -- 3 x 3 x -- -- --    Urine Void (mL) -- 200 200 -- -- --    Stool  --  -- --  --  -- --    Number of Times Stooled -- 1 x 1 x -- -- --    Total Output -- 200 200 -- -- --       Net I/O     -- -200 -200 300 -- 300            Intake/Output Summary (Last 24 hours) at 4/10/2022 1206  Last data filed at 4/10/2022 0814  Gross per 24 hour   Intake 300 ml   Output 200 ml   Net 100 ml            • insulin GLARGINE  0.2 Units/kg/day Q EVENING    And   • insulin LISPRO  0.2 Units/kg/day TID AC    And   • insulin LISPRO  2-9 Units 4X/DAY ACHS    And   • dextrose bolus  25 g Q15 MIN PRN   • atorvastatin  10 mg Nightly   • FLUoxetine  20 mg DAILY   • lisinopril  20 mg DAILY   • omeprazole  40 mg DAILY   • levETIRAcetam  500 mg BID   • haloperidol  lactate  1 mg Q8HRS   • enoxaparin (LOVENOX) injection  40 mg DAILY   • dexamethasone  4 mg Q6HRS   • gabapentin  1,200 mg BID   • albuterol  2 Puff Q6HRS PRN   • traMADol  50 mg Q12HRS PRN   • senna-docusate  2 Tablet BID    And   • polyethylene glycol/lytes  1 Packet QDAY PRN    And   • magnesium hydroxide  30 mL QDAY PRN    And   • bisacodyl  10 mg QDAY PRN   • Pharmacy Consult Request  1 Each PHARMACY TO DOSE   • oxyCODONE immediate-release  2.5 mg Q3HRS PRN    Or   • oxyCODONE immediate-release  5 mg Q3HRS PRN    Or   • HYDROmorphone  0.25 mg Q3HRS PRN   • hydrALAZINE  10 mg Q4HRS PRN   • LORazepam  1 mg Q2HRS PRN       Assessment and Plan:  HD#4  Heterogenous mass in the right parietal region    MRI's completed 4/8/22  CT CAP not yet complete  Continue Decadron/ Keppra  Pt on Lovenox per IM  Surgical plan per Dr. Lewis

## 2022-04-10 NOTE — PROGRESS NOTES
[facility] Medicine Daily Progress Note    Date of Service  4/10/2022    Chief Complaint  Mark Marcelo is a 72 y.o. male admitted 4/7/2022 with headache.     Hospital Course  Mark Marcelo is a 72 y.o. male with history of diabetes, hypertension, dyslipidemia who presented 4/7/2022 with 12 hours of worsening headache prompting evaluation emergency department where he is found to have a right temporal mass concerning for glioblastoma    Interval Problem Update  Patient was seen and examined at bedside.      A&O x0, opens eyes to voice, follows commands  Agitation improved overnight.  Now more stuporous than delirious.  Remains noncontributory to subjective exam.  MRI unable to be undertaken, agitation.  Will need assistance of anesthesia.  Neurosurgery recs  Keppra 500mg BID    I have personally seen and examined the patient at bedside. I discussed the plan of care with patient, bedside RN and neurosurgery.    Consultants/Specialty  neurosurgery    Code Status  Full Code    Disposition  Patient is not medically cleared for discharge.   Anticipate discharge to to home with close outpatient follow-up.  I have placed the appropriate orders for post-discharge needs.    Review of Systems  Unable to be obtained secondary to clinical condition    Physical Exam  Temp:  [36.1 °C (96.9 °F)-36.3 °C (97.3 °F)] 36.1 °C (97 °F)  Pulse:  [66-74] 66  Resp:  [16-19] 19  BP: (102-119)/(52-61) 102/61  SpO2:  [96 %-97 %] 96 %  General appearance: NAD, delirious, not responding appropriately.  Neck: FROM, supple  Lungs: Clear to auscultation  CV: RRR, no MRGs; normal carotid upstroke and amplitude without bruits  Abdomen: Soft, non-tender; no masses or HSM  Extremities: No peripheral edema or digital cyanosis  Skin: no rash, lesions or ulcers  Psych: Unable to be assessed.      Current Facility-Administered Medications:   •  insulin GLARGINE (Lantus,Semglee) injection, 0.2 Units/kg/day, Subcutaneous, Q EVENING **AND** insulin LISPRO  (AdmeLOG,HumaLOG) injection, 0.2 Units/kg/day, Subcutaneous, TID AC **AND** insulin LISPRO (AdmeLOG,HumaLOG) injection, 2-9 Units, Subcutaneous, 4X/DAY ACHS **AND** POC blood glucose manual result, , , Q AC AND BEDTIME(S) **AND** NOTIFY MD and PharmD, , , Once **AND** Administer 20 grams of glucose (approximately 8 ounces of fruit juice) every 15 minutes PRN FSBG less than 70 mg/dL, , , PRN **AND** dextrose 50% (D50W) injection 25 g, 25 g, Intravenous, Q15 MIN PRN, Marcelino Flores M.D.  •  metoclopramide (REGLAN) injection 10 mg, 10 mg, Intravenous, Q4HRS PRN, Marcelino Flores M.D.  •  atorvastatin (LIPITOR) tablet 10 mg, 10 mg, Oral, Nightly, Asael Small D.O., 10 mg at 04/09/22 2056  •  FLUoxetine (PROZAC) capsule 20 mg, 20 mg, Oral, DAILY, Asael Small D.O., 20 mg at 04/10/22 0545  •  lisinopril (PRINIVIL) tablet 20 mg, 20 mg, Oral, DAILY, Asael Small D.O., 20 mg at 04/10/22 0545  •  omeprazole (PRILOSEC) capsule 40 mg, 40 mg, Oral, DAILY, Asael Small D.O., 40 mg at 04/10/22 0545  •  levETIRAcetam (KEPPRA) tablet 500 mg, 500 mg, Oral, BID, Asael Small D.O., 500 mg at 04/10/22 0545  •  haloperidol lactate (HALDOL) injection 1 mg, 1 mg, Intravenous, Q8HRS, TYRONE Smith.O., 1 mg at 04/10/22 0633  •  enoxaparin (LOVENOX) inj 40 mg, 40 mg, Subcutaneous, DAILY, Asael Small D.O., 40 mg at 04/10/22 0544  •  dexamethasone (DECADRON) injection 4 mg, 4 mg, Intravenous, Q6HRS, Asael Small D.O., 4 mg at 04/10/22 1104  •  gabapentin (NEURONTIN) capsule 1,200 mg, 1,200 mg, Oral, BID, Noble Salinas M.D., 1,200 mg at 04/10/22 0544  •  albuterol inhaler 2 Puff, 2 Puff, Inhalation, Q6HRS PRN, Noble Salinas M.D.  •  traMADol (ULTRAM) 50 MG tablet 50 mg, 50 mg, Oral, Q12HRS PRN, Noble Salinas M.D.  •  senna-docusate (PERICOLACE or SENOKOT S) 8.6-50 MG per tablet 2 Tablet, 2 Tablet, Oral, BID, 2 Tablet at 04/09/22 1712 **AND** polyethylene glycol/lytes (MIRALAX) PACKET 1 Packet, 1 Packet, Oral, QDAY  PRN **AND** magnesium hydroxide (MILK OF MAGNESIA) suspension 30 mL, 30 mL, Oral, QDAY PRN **AND** bisacodyl (DULCOLAX) suppository 10 mg, 10 mg, Rectal, QDAY PRN, Noble Salinas M.D.  •  [COMPLETED] Notify provider if pain remains uncontrolled, , , CONTINUOUS **AND** [COMPLETED] Use the Numeric Rating Scale (NRS), Floyd-Baker Faces (WBF), or FLACC on regular floors and Critical-Care Pain Observation Tool (CPOT) on ICUs/Trauma to assess pain, , , CONTINUOUS **AND** [COMPLETED] Pulse Ox, , , CONTINUOUS **AND** Pharmacy Consult Request ...Pain Management Review 1 Each, 1 Each, Other, PHARMACY TO DOSE **AND** [COMPLETED] If patient difficult to arouse and/or has respiratory depression (respiratory rate of 10 or less), stop any opiates that are currently infusing and call a Rapid Response., , , CONTINUOUS, Noble Salinas M.D.  •  oxyCODONE immediate-release (ROXICODONE) tablet 2.5 mg, 2.5 mg, Oral, Q3HRS PRN **OR** oxyCODONE immediate-release (ROXICODONE) tablet 5 mg, 5 mg, Oral, Q3HRS PRN **OR** HYDROmorphone (Dilaudid) injection 0.25 mg, 0.25 mg, Intravenous, Q3HRS PRN, Noble Salinas M.D.  •  hydrALAZINE (APRESOLINE) injection 10 mg, 10 mg, Intravenous, Q4HRS PRNNoble M.D.  •  LORazepam (ATIVAN) injection 1 mg, 1 mg, Intravenous, Q2HRS PRN, Noble Salinas M.D., 1 mg at 04/10/22 0633      Fluids    Intake/Output Summary (Last 24 hours) at 4/10/2022 1508  Last data filed at 4/10/2022 0814  Gross per 24 hour   Intake 300 ml   Output 200 ml   Net 100 ml       Laboratory  Recent Labs     04/07/22  2131 04/09/22  0424   WBC 9.4 11.4*   RBC 4.30* 4.12*   HEMOGLOBIN 12.1* 11.6*   HEMATOCRIT 36.4* 35.2*   MCV 84.7 85.4   MCH 28.1 28.2   MCHC 33.2* 33.0*   RDW 43.6 44.3   PLATELETCT 278 285   MPV 9.1 9.4     Recent Labs     04/07/22  2131 04/09/22  0424   SODIUM 136 135   POTASSIUM 3.8 4.5   CHLORIDE 102 101   CO2 21 21   GLUCOSE 148* 262*   BUN 26* 41*   CREATININE 1.01 0.93   CALCIUM 9.2 9.0                    Imaging  MR-BRAIN-WITH & W/O         MR-STEALTH BRAIN WITH & W/O         CT-HEAD W/O   Final Result         1.  Heterogeneous right parietal mass with associated vasogenic edema resulting in effacement of the posterior right ventricle and 5 mm right to left midline shift. Further characterization with MRI of the brain with contrast recommended.   2.  Atherosclerosis.              Assessment/Plan  * Brain mass- (present on admission)  Assessment & Plan  Complicated by vasogenic edema and a 5 mm right to left shift.   Visual field compromise  Dexamethasone 10mg q6 x12 hours  Then dexamethasone 4mg q6   Keppra 500mg BID  We will need assistance of anesthesia to complete MRI, imaging reordered.  Neurosurgery recs    Acute respiratory failure with hypoxia (HCC)  Assessment & Plan  Possibly sequelae of sedation  SpO2 84% on room air  Supplemental oxygen to maintain SpO2 >92%  monitor    Toxic metabolic encephalopathy  Assessment & Plan  Secondary to brain mass  Ativan 1 mg x 2, soft wrist restraints.  Haldol 1mg IV every 8 hours     Diabetes type 2, uncontrolled (HCC)- (present on admission)  Assessment & Plan  Regular insulin sliding scale every 6 hours as needed.  Lantus added 4/9, CTM, titrate daily to goal bg     Depression- (present on admission)  Assessment & Plan  Home SSRI       VTE prophylaxis: SCDs/TEDs and enoxaparin ppx    I have performed a physical exam and reviewed and updated ROS and Plan today (4/10/2022). In review of yesterday's note (4/9/2022), there are no changes except as documented above.

## 2022-04-11 ENCOUNTER — ANESTHESIA (OUTPATIENT)
Dept: RADIOLOGY | Facility: MEDICAL CENTER | Age: 72
DRG: 054 | End: 2022-04-11
Payer: MEDICARE

## 2022-04-11 ENCOUNTER — ANESTHESIA EVENT (OUTPATIENT)
Dept: RADIOLOGY | Facility: MEDICAL CENTER | Age: 72
DRG: 054 | End: 2022-04-11
Payer: MEDICARE

## 2022-04-11 ENCOUNTER — APPOINTMENT (OUTPATIENT)
Dept: RADIOLOGY | Facility: MEDICAL CENTER | Age: 72
DRG: 054 | End: 2022-04-11
Attending: HOSPITALIST
Payer: MEDICARE

## 2022-04-11 LAB
GLUCOSE BLD STRIP.AUTO-MCNC: 236 MG/DL (ref 65–99)
GLUCOSE BLD STRIP.AUTO-MCNC: 343 MG/DL (ref 65–99)

## 2022-04-11 PROCEDURE — 700105 HCHG RX REV CODE 258: Performed by: ANESTHESIOLOGY

## 2022-04-11 PROCEDURE — 700111 HCHG RX REV CODE 636 W/ 250 OVERRIDE (IP): Performed by: INTERNAL MEDICINE

## 2022-04-11 PROCEDURE — 71260 CT THORAX DX C+: CPT | Mod: ME

## 2022-04-11 PROCEDURE — 700117 HCHG RX CONTRAST REV CODE 255: Performed by: HOSPITALIST

## 2022-04-11 PROCEDURE — 01922 ANES N-INVAS IMG/RADJ THER: CPT | Performed by: ANESTHESIOLOGY

## 2022-04-11 PROCEDURE — 700111 HCHG RX REV CODE 636 W/ 250 OVERRIDE (IP): Performed by: ANESTHESIOLOGY

## 2022-04-11 PROCEDURE — 99100 ANES PT EXTEME AGE<1 YR&>70: CPT | Performed by: ANESTHESIOLOGY

## 2022-04-11 PROCEDURE — A9270 NON-COVERED ITEM OR SERVICE: HCPCS | Performed by: INTERNAL MEDICINE

## 2022-04-11 PROCEDURE — 70553 MRI BRAIN STEM W/O & W/DYE: CPT | Mod: MG

## 2022-04-11 PROCEDURE — 770001 HCHG ROOM/CARE - MED/SURG/GYN PRIV*

## 2022-04-11 PROCEDURE — 82962 GLUCOSE BLOOD TEST: CPT

## 2022-04-11 PROCEDURE — 160035 HCHG PACU - 1ST 60 MINS PHASE I

## 2022-04-11 PROCEDURE — 700101 HCHG RX REV CODE 250: Performed by: ANESTHESIOLOGY

## 2022-04-11 PROCEDURE — 160002 HCHG RECOVERY MINUTES (STAT)

## 2022-04-11 PROCEDURE — 700102 HCHG RX REV CODE 250 W/ 637 OVERRIDE(OP): Performed by: INTERNAL MEDICINE

## 2022-04-11 PROCEDURE — A9576 INJ PROHANCE MULTIPACK: HCPCS | Performed by: HOSPITALIST

## 2022-04-11 PROCEDURE — 99232 SBSQ HOSP IP/OBS MODERATE 35: CPT | Performed by: HOSPITALIST

## 2022-04-11 RX ORDER — SODIUM CHLORIDE, SODIUM GLUCONATE, SODIUM ACETATE, POTASSIUM CHLORIDE AND MAGNESIUM CHLORIDE 526; 502; 368; 37; 30 MG/100ML; MG/100ML; MG/100ML; MG/100ML; MG/100ML
INJECTION, SOLUTION INTRAVENOUS
Status: DISCONTINUED | OUTPATIENT
Start: 2022-04-11 | End: 2022-04-11 | Stop reason: SURG

## 2022-04-11 RX ORDER — DEXTROSE MONOHYDRATE 25 G/50ML
12.5 INJECTION, SOLUTION INTRAVENOUS
Status: DISCONTINUED | OUTPATIENT
Start: 2022-04-11 | End: 2022-04-11 | Stop reason: HOSPADM

## 2022-04-11 RX ORDER — HYDRALAZINE HYDROCHLORIDE 20 MG/ML
5 INJECTION INTRAMUSCULAR; INTRAVENOUS
Status: DISCONTINUED | OUTPATIENT
Start: 2022-04-11 | End: 2022-04-11 | Stop reason: HOSPADM

## 2022-04-11 RX ORDER — HYDROMORPHONE HYDROCHLORIDE 1 MG/ML
0.1 INJECTION, SOLUTION INTRAMUSCULAR; INTRAVENOUS; SUBCUTANEOUS
Status: DISCONTINUED | OUTPATIENT
Start: 2022-04-11 | End: 2022-04-11 | Stop reason: HOSPADM

## 2022-04-11 RX ORDER — HYDROMORPHONE HYDROCHLORIDE 1 MG/ML
0.4 INJECTION, SOLUTION INTRAMUSCULAR; INTRAVENOUS; SUBCUTANEOUS
Status: DISCONTINUED | OUTPATIENT
Start: 2022-04-11 | End: 2022-04-11 | Stop reason: HOSPADM

## 2022-04-11 RX ORDER — ONDANSETRON 2 MG/ML
4 INJECTION INTRAMUSCULAR; INTRAVENOUS
Status: DISCONTINUED | OUTPATIENT
Start: 2022-04-11 | End: 2022-04-11 | Stop reason: HOSPADM

## 2022-04-11 RX ORDER — LIDOCAINE HYDROCHLORIDE 20 MG/ML
INJECTION, SOLUTION EPIDURAL; INFILTRATION; INTRACAUDAL; PERINEURAL PRN
Status: DISCONTINUED | OUTPATIENT
Start: 2022-04-11 | End: 2022-04-11 | Stop reason: SURG

## 2022-04-11 RX ORDER — DIPHENHYDRAMINE HYDROCHLORIDE 50 MG/ML
12.5 INJECTION INTRAMUSCULAR; INTRAVENOUS
Status: DISCONTINUED | OUTPATIENT
Start: 2022-04-11 | End: 2022-04-11 | Stop reason: HOSPADM

## 2022-04-11 RX ORDER — HYDROMORPHONE HYDROCHLORIDE 1 MG/ML
0.2 INJECTION, SOLUTION INTRAMUSCULAR; INTRAVENOUS; SUBCUTANEOUS
Status: DISCONTINUED | OUTPATIENT
Start: 2022-04-11 | End: 2022-04-11 | Stop reason: HOSPADM

## 2022-04-11 RX ADMIN — SODIUM CHLORIDE, SODIUM GLUCONATE, SODIUM ACETATE, POTASSIUM CHLORIDE AND MAGNESIUM CHLORIDE: 526; 502; 368; 37; 30 INJECTION, SOLUTION INTRAVENOUS at 11:39

## 2022-04-11 RX ADMIN — INSULIN LISPRO 6 UNITS: 100 INJECTION, SOLUTION INTRAVENOUS; SUBCUTANEOUS at 21:01

## 2022-04-11 RX ADMIN — OMEPRAZOLE 40 MG: 20 CAPSULE, DELAYED RELEASE ORAL at 04:49

## 2022-04-11 RX ADMIN — LORAZEPAM 1 MG: 2 INJECTION INTRAMUSCULAR; INTRAVENOUS at 04:45

## 2022-04-11 RX ADMIN — DEXAMETHASONE SODIUM PHOSPHATE 4 MG: 4 INJECTION, SOLUTION INTRA-ARTICULAR; INTRALESIONAL; INTRAMUSCULAR; INTRAVENOUS; SOFT TISSUE at 14:10

## 2022-04-11 RX ADMIN — ATORVASTATIN CALCIUM 10 MG: 10 TABLET, FILM COATED ORAL at 20:51

## 2022-04-11 RX ADMIN — HALOPERIDOL LACTATE 1 MG: 5 INJECTION, SOLUTION INTRAMUSCULAR at 04:45

## 2022-04-11 RX ADMIN — DEXAMETHASONE SODIUM PHOSPHATE 4 MG: 4 INJECTION, SOLUTION INTRA-ARTICULAR; INTRALESIONAL; INTRAMUSCULAR; INTRAVENOUS; SOFT TISSUE at 17:17

## 2022-04-11 RX ADMIN — HALOPERIDOL LACTATE 1 MG: 5 INJECTION, SOLUTION INTRAMUSCULAR at 22:27

## 2022-04-11 RX ADMIN — PROPOFOL 100 MG: 10 INJECTION, EMULSION INTRAVENOUS at 11:50

## 2022-04-11 RX ADMIN — FLUOXETINE 20 MG: 20 CAPSULE ORAL at 04:49

## 2022-04-11 RX ADMIN — LISINOPRIL 20 MG: 20 TABLET ORAL at 04:49

## 2022-04-11 RX ADMIN — EPHEDRINE SULFATE 10 MG: 50 INJECTION INTRAMUSCULAR; INTRAVENOUS; SUBCUTANEOUS at 11:58

## 2022-04-11 RX ADMIN — LIDOCAINE HYDROCHLORIDE 100 MG: 20 INJECTION, SOLUTION EPIDURAL; INFILTRATION; INTRACAUDAL at 11:47

## 2022-04-11 RX ADMIN — ROCURONIUM BROMIDE 50 MG: 10 INJECTION, SOLUTION INTRAVENOUS at 11:50

## 2022-04-11 RX ADMIN — INSULIN LISPRO 6 UNITS: 100 INJECTION, SOLUTION INTRAVENOUS; SUBCUTANEOUS at 17:01

## 2022-04-11 RX ADMIN — SUGAMMADEX 200 MG: 100 INJECTION, SOLUTION INTRAVENOUS at 12:44

## 2022-04-11 RX ADMIN — ENOXAPARIN SODIUM 40 MG: 40 INJECTION SUBCUTANEOUS at 04:51

## 2022-04-11 RX ADMIN — IOHEXOL 100 ML: 350 INJECTION, SOLUTION INTRAVENOUS at 18:05

## 2022-04-11 RX ADMIN — GADOTERIDOL 20 ML: 279.3 INJECTION, SOLUTION INTRAVENOUS at 12:58

## 2022-04-11 RX ADMIN — HALOPERIDOL LACTATE 1 MG: 5 INJECTION, SOLUTION INTRAMUSCULAR at 14:07

## 2022-04-11 RX ADMIN — DEXAMETHASONE SODIUM PHOSPHATE 4 MG: 4 INJECTION, SOLUTION INTRA-ARTICULAR; INTRALESIONAL; INTRAMUSCULAR; INTRAVENOUS; SOFT TISSUE at 00:38

## 2022-04-11 RX ADMIN — DEXAMETHASONE SODIUM PHOSPHATE 4 MG: 4 INJECTION, SOLUTION INTRA-ARTICULAR; INTRALESIONAL; INTRAMUSCULAR; INTRAVENOUS; SOFT TISSUE at 04:45

## 2022-04-11 RX ADMIN — FENTANYL CITRATE 50 MCG: 50 INJECTION, SOLUTION INTRAMUSCULAR; INTRAVENOUS at 11:50

## 2022-04-11 RX ADMIN — INSULIN LISPRO 3 UNITS: 100 INJECTION, SOLUTION INTRAVENOUS; SUBCUTANEOUS at 17:01

## 2022-04-11 RX ADMIN — LORAZEPAM 1 MG: 2 INJECTION INTRAMUSCULAR; INTRAVENOUS at 00:38

## 2022-04-11 RX ADMIN — GABAPENTIN 1200 MG: 400 CAPSULE ORAL at 04:49

## 2022-04-11 RX ADMIN — LEVETIRACETAM 500 MG: 500 TABLET, FILM COATED ORAL at 17:05

## 2022-04-11 RX ADMIN — LORAZEPAM 1 MG: 2 INJECTION INTRAMUSCULAR; INTRAVENOUS at 20:51

## 2022-04-11 RX ADMIN — LEVETIRACETAM 500 MG: 500 TABLET, FILM COATED ORAL at 04:49

## 2022-04-11 ASSESSMENT — PAIN SCALES - GENERAL: PAIN_LEVEL: 0

## 2022-04-11 NOTE — ANESTHESIA PROCEDURE NOTES
Airway    Date/Time: 4/11/2022 11:53 AM  Performed by: Matthew Cadet M.D.  Authorized by: Matthew Cadet M.D.     Location:  OR  Urgency:  Elective  Indications for Airway Management:  Anesthesia      Spontaneous Ventilation: absent    Sedation Level:  Deep  Preoxygenated: Yes    Patient Position:  Sniffing  Final Airway Type:  Endotracheal airway  Final Endotracheal Airway:  ETT  Cuffed: Yes    Technique Used for Successful ETT Placement:  Video laryngoscopy    Insertion Site:  Oral  Blade Type:  Glide  Laryngoscope Blade/Videolaryngoscope Blade Size:  4  ETT Size (mm):  7.5  Measured from:  Lips  ETT to Lips (cm):  22  Placement Verified by: auscultation and capnometry    Cormack-Lehane Classification:  Grade I - full view of glottis  Number of Attempts at Approach:  1   Atraumatic x1 attempt

## 2022-04-11 NOTE — ANESTHESIA PREPROCEDURE EVALUATION
Date/Time: 22 1025    Procedure: MR-BRAIN-WITH & W/O    Diagnosis:       Brain mass [G93.89]      Brain mass [G93.89]    Location: [facility]        Anes H&P:  PAST MEDICAL HISTORY:   72 y.o. male who presents for Procedure(s):  RECOVERY ONLY.  He has current and past medical problems significant for:      Past Medical History:   Diagnosis Date   • Depression     was on cymbalta and abilify.  he was taken off abilify d/t elevated  glucose   • Diabetes    • Diabetes type 2, uncontrolled (HCC) 2013    not at goal hisotrically. Currently worse than usual due to prednisone Rx by ophthalmology. Increase meds and RTC 2-3 weeks. Check sugars bid.   • Dyspnea 2013    suspect COPD and likely MOOKIE. Sats are adequate. Consider overnight oximetry, sleep study. CXR ordered.   • Hyperlipidemia    • Hypertension associated with diabetes (HCC)    • Insomnia    • Joint pain 2013   • Neuropathy 2013   • OPTIC NEURITIS 2013    Continue prednisone taper as per Ophthalmology   • Pseudophakia of right eye 2013    Dr Beckham   • Ulcer    • Vitamin d deficiency        SMOKING/ALCOHOL/RECREATIONAL DRUG USE:  Social History     Tobacco Use   • Smoking status: Former Smoker     Packs/day: 1.00     Years: 40.00     Pack years: 40.00     Types: Cigarettes     Quit date: 2010     Years since quittin.2   • Smokeless tobacco: Never Used   Vaping Use   • Vaping Use: Never used   Substance Use Topics   • Alcohol use: No     Alcohol/week: 0.0 oz     Comment: hx of heavy etoh abuse   • Drug use: No     Social History     Substance and Sexual Activity   Drug Use No       PAST SURGICAL HISTORY:  Past Surgical History:   Procedure Laterality Date   • CATARACT PHACO WITH IOL  13    Dr Beckham   • GASTRIC RESECTION      ulcer bleeding   • ORIF, ANKLE      right   • RETINAL DETACHMENT REPAIR         ALLERGIES:   Allergies   Allergen Reactions   • Acetaminophen Vomiting   •  Advil [Ibuprofen]        MEDICATIONS:  No current facility-administered medications on file prior to encounter.     Current Outpatient Medications on File Prior to Encounter   Medication Sig Dispense Refill   • FLUoxetine (PROZAC) 10 MG Cap Take 20 mg by mouth every day.     • lisinopril (PRINIVIL) 20 MG Tab Take 20 mg by mouth every day.     • omeprazole (PRILOSEC) 40 MG delayed-release capsule Take 40 mg by mouth every day.     • hydrOXYzine HCl (ATARAX) 10 MG Tab Take 10 mg by mouth every 8 hours as needed for Anxiety.     • venlafaxine XR (EFFEXOR XR) 75 MG CAPSULE SR 24 HR Take 75 mg by mouth every day.     • traMADol (ULTRAM) 50 MG Tab Take 50 mg by mouth every 6 hours as needed. Indications: Pain     • Suvorexant (BELSOMRA) 10 MG Tab Take 10 mg by mouth at bedtime.     • atorvastatin (LIPITOR) 10 MG Tab Take 10 mg by mouth every evening.     • glipiZIDE (GLUCOTROL) 10 MG Tab Take 10 mg by mouth 2 times a day.     • gabapentin (NEURONTIN) 600 MG tablet Take 1,200 mg by mouth 2 times a day.     • metformin (GLUCOPHAGE) 1000 MG tablet Take 1 Tab by mouth 2 times a day, with meals. 60 Tab 3       LABS:  Lab Results   Component Value Date/Time    HEMOGLOBIN 11.6 (L) 04/09/2022 0424    HEMATOCRIT 35.2 (L) 04/09/2022 0424    WBC 11.4 (H) 04/09/2022 0424     Lab Results   Component Value Date/Time    SODIUM 135 04/09/2022 0424    POTASSIUM 4.5 04/09/2022 0424    CHLORIDE 101 04/09/2022 0424    CO2 21 04/09/2022 0424    GLUCOSE 262 (H) 04/09/2022 0424    BUN 41 (H) 04/09/2022 0424    CALCIUM 9.0 04/09/2022 0424       SARS-CoV2 result: Negative      PREVIOUS ANESTHETICS: See EMR  __________________________________________    Relevant Problems   PULMONARY   (positive) Dyspnea      CARDIAC   (positive) Hypertension associated with diabetes (HCC)       Physical Exam    Airway   Mallampati: III  TM distance: >3 FB  Neck ROM: limited       Cardiovascular - normal exam  Rhythm: regular  Rate: normal  (-) murmur     Dental -  normal exam  (+) upper dentures           Pulmonary - normal exam  Breath sounds clear to auscultation     Abdominal   (+) obese     Neurological - normal exam                 Anesthesia Plan    ASA 4   ASA physical status 4 criteria: other (comment)    Plan - general       Airway plan will be ETT          Induction: intravenous    Postoperative Plan: Postoperative administration of opioids is intended.    Pertinent diagnostic labs and testing reviewed    Informed Consent:    Anesthetic plan and risks discussed with patient.    Use of blood products discussed with: patient whom consented to blood products.       Two MD consent given that patient is unable to give consent 2/2 mental status and partner under ethics review. ICU MD agrees that delay in imaging would have detrimental patient outcome.

## 2022-04-11 NOTE — ANESTHESIA POSTPROCEDURE EVALUATION
Patient: Mark Marcelo    Procedure Summary     Date: 04/11/22 Room / Location: [facility]    Anesthesia Start: 1139 Anesthesia Stop: 1258    Procedure: MR-BRAIN-WITH & W/O Diagnosis:       Brain mass      Brain mass    Scheduled Providers:  Responsible Provider: Matthew Cadet M.D.    Anesthesia Type: general ASA Status: 4          Final Anesthesia Type: general  Last vitals  BP   Blood Pressure : 155/92    Temp   36.3 °C (97.3 °F)    Pulse   90   Resp   12    SpO2   91 %      Anesthesia Post Evaluation    Patient location during evaluation: PACU  Patient participation: complete - patient participated  Level of consciousness: confused  Pain score: 0    Airway patency: patent  Anesthetic complications: no  Cardiovascular status: hemodynamically stable  Respiratory status: acceptable  Hydration status: euvolemic    PONV: none          No complications documented.     Nurse Pain Score: 0 (NPRS)

## 2022-04-11 NOTE — PROGRESS NOTES
[facility] Medicine Daily Progress Note    Date of Service  4/11/2022    Chief Complaint  Makr Marcelo is a 72 y.o. male admitted 4/7/2022 with headache.     Hospital Course  Mark Marcelo is a 72 y.o. male with history of diabetes, hypertension, dyslipidemia who presented 4/7/2022 with 12 hours of worsening headache prompting evaluation emergency department where he is found to have a right temporal mass concerning for glioblastoma    Interval Problem Update  Patient was seen and examined at bedside.      A&O x1, opens eyes to voice, follows commands, answering some questions.    MRI undertaken with with assistance of anesthesia.  Neurosurgery recs pending MRI.  CT C/A/P per their recs.    Keppra 500mg BID    I have personally seen and examined the patient at bedside. I discussed the plan of care with patient, bedside RN and neurosurgery.    Consultants/Specialty  neurosurgery    Code Status  Full Code    Disposition  Patient is not medically cleared for discharge.   Anticipate discharge to to home with close outpatient follow-up.  I have placed the appropriate orders for post-discharge needs.    Review of Systems  Unable to be obtained secondary to clinical condition    Physical Exam  Temp:  [35.9 °C (96.7 °F)-36.8 °C (98.2 °F)] 36.3 °C (97.3 °F)  Pulse:  [73-94] 90  Resp:  [12-20] 12  BP: (111-155)/(59-92) 155/92  SpO2:  [90 %-100 %] 91 %  General appearance: NAD, delirious, not responding appropriately.  Neck: FROM, supple  Lungs: Clear to auscultation  CV: RRR, no MRGs; normal carotid upstroke and amplitude without bruits  Abdomen: Soft, non-tender; no masses or HSM  Extremities: No peripheral edema or digital cyanosis  Skin: no rash, lesions or ulcers  Psych: Unable to be assessed.      Current Facility-Administered Medications:   •  PROPOFOL 10 MG/ML IV EMUL, , , ,   •  HYDROmorphone (Dilaudid) injection 0.1 mg, 0.1 mg, Intravenous, Q5 MIN PRN **OR** HYDROmorphone (Dilaudid) injection 0.2 mg, 0.2 mg, Intravenous,  Q5 MIN PRN **OR** HYDROmorphone (Dilaudid) injection 0.4 mg, 0.4 mg, Intravenous, Q5 MIN PRN, Matthew Cadet M.D.  •  ondansetron (ZOFRAN) syringe/vial injection 4 mg, 4 mg, Intravenous, Once PRN, Matthew Cadet M.D.  •  diphenhydrAMINE (BENADRYL) injection 12.5 mg, 12.5 mg, Intravenous, Q15 MIN PRN, Matthew Cadet M.D.  •  albuterol (PROVENTIL) 2.5mg/0.5ml nebulizer solution 2.5 mg, 2.5 mg, Inhalation, Q10 MIN PRN, Matthew Cadet M.D.  •  hydrALAZINE (APRESOLINE) injection 5 mg, 5 mg, Intravenous, Q5 MIN PRN, Matthew Cadet M.D.  •  ePHEDrine injection 5 mg, 5 mg, Intravenous, Q5 MIN PRN, Matthew Cadet M.D.  •  insulin regular (HumuLIN R,NovoLIN R) injection, 2-9 Units, Subcutaneous, Q6HRS **AND** POC blood glucose manual result, , , Q6H **AND** NOTIFY MD and PharmD, , , Once **AND** Administer 20 grams of glucose (approximately 8 ounces of fruit juice) every 15 minutes PRN FSBG less than 70 mg/dL, , , PRN **AND** dextrose 50% (D50W) injection 12.5 g, 12.5 g, Intravenous, Q15 MIN PRN, Matthew Cadet M.D.  •  [MAR Hold] insulin GLARGINE (Lantus,Semglee) injection, 0.2 Units/kg/day, Subcutaneous, Q EVENING, 17 Units at 04/10/22 1647 **AND** [MAR Hold] insulin LISPRO (AdmeLOG,HumaLOG) injection, 0.2 Units/kg/day, Subcutaneous, TID AC, 6 Units at 04/10/22 1702 **AND** [MAR Hold] insulin LISPRO (AdmeLOG,HumaLOG) injection, 2-9 Units, Subcutaneous, 4X/DAY ACHS, 5 Units at 04/10/22 2113 **AND** POC blood glucose manual result, , , Q AC AND BEDTIME(S) **AND** NOTIFY MD and PharmD, , , Once **AND** Administer 20 grams of glucose (approximately 8 ounces of fruit juice) every 15 minutes PRN FSBG less than 70 mg/dL, , , PRN **AND** [MAR Hold] dextrose 50% (D50W) injection 25 g, 25 g, Intravenous, Q15 MIN PRN, Marcelino Flores M.D.  •  [MAR Hold] metoclopramide (REGLAN) injection 10 mg, 10 mg, Intravenous, Q4HRS PRN, Marcelino Flores M.D.  •  [MAR Hold] atorvastatin (LIPITOR)  tablet 10 mg, 10 mg, Oral, Nightly, Asael Small D.O., 10 mg at 04/10/22 2120  •  [MAR Hold] FLUoxetine (PROZAC) capsule 20 mg, 20 mg, Oral, DAILY, Asael Small D.O., 20 mg at 04/11/22 0449  •  [MAR Hold] lisinopril (PRINIVIL) tablet 20 mg, 20 mg, Oral, DAILY, Asael Small D.O., 20 mg at 04/11/22 0449  •  [MAR Hold] omeprazole (PRILOSEC) capsule 40 mg, 40 mg, Oral, DAILY, Asael Small D.O., 40 mg at 04/11/22 0449  •  [MAR Hold] levETIRAcetam (KEPPRA) tablet 500 mg, 500 mg, Oral, BID, Asael Small D.O., 500 mg at 04/11/22 0449  •  [MAR Hold] haloperidol lactate (HALDOL) injection 1 mg, 1 mg, Intravenous, Q8HRS, Asael Small D.O., 1 mg at 04/11/22 0445  •  [MAR Hold] enoxaparin (LOVENOX) inj 40 mg, 40 mg, Subcutaneous, DAILY, Asael Small D.O., 40 mg at 04/11/22 0451  •  [MAR Hold] dexamethasone (DECADRON) injection 4 mg, 4 mg, Intravenous, Q6HRS, Asael Small D.O., 4 mg at 04/11/22 0445  •  [MAR Hold] gabapentin (NEURONTIN) capsule 1,200 mg, 1,200 mg, Oral, BID, Noble Salinas M.D., 1,200 mg at 04/11/22 0449  •  [MAR Hold] albuterol inhaler 2 Puff, 2 Puff, Inhalation, Q6HRS PRN, Noble Salinas M.D.  •  [MAR Hold] traMADol (ULTRAM) 50 MG tablet 50 mg, 50 mg, Oral, Q12HRS PRN, Noble Salinas M.D.  •  [MAR Hold] senna-docusate (PERICOLACE or SENOKOT S) 8.6-50 MG per tablet 2 Tablet, 2 Tablet, Oral, BID, 2 Tablet at 04/09/22 1712 **AND** [MAR Hold] polyethylene glycol/lytes (MIRALAX) PACKET 1 Packet, 1 Packet, Oral, QDAY PRN **AND** [MAR Hold] magnesium hydroxide (MILK OF MAGNESIA) suspension 30 mL, 30 mL, Oral, QDAY PRN **AND** [MAR Hold] bisacodyl (DULCOLAX) suppository 10 mg, 10 mg, Rectal, QDAY PRN, Noble Salinas M.D.  •  [COMPLETED] Notify provider if pain remains uncontrolled, , , CONTINUOUS **AND** [COMPLETED] Use the Numeric Rating Scale (NRS), Floyd-Baker Faces (WBF), or FLACC on regular floors and Critical-Care Pain Observation Tool (CPOT) on ICUs/Trauma to assess pain, , ,  CONTINUOUS **AND** [COMPLETED] Pulse Ox, , , CONTINUOUS **AND** [MAR Hold] Pharmacy Consult Request ...Pain Management Review 1 Each, 1 Each, Other, PHARMACY TO DOSE **AND** [COMPLETED] If patient difficult to arouse and/or has respiratory depression (respiratory rate of 10 or less), stop any opiates that are currently infusing and call a Rapid Response., , , CONTINUOUS, Noble Salinas M.D.  •  [MAR Hold] oxyCODONE immediate-release (ROXICODONE) tablet 2.5 mg, 2.5 mg, Oral, Q3HRS PRN **OR** [MAR Hold] oxyCODONE immediate-release (ROXICODONE) tablet 5 mg, 5 mg, Oral, Q3HRS PRN **OR** [MAR Hold] HYDROmorphone (Dilaudid) injection 0.25 mg, 0.25 mg, Intravenous, Q3HRS PRN, Noble Salinas M.D.  •  [MAR Hold] hydrALAZINE (APRESOLINE) injection 10 mg, 10 mg, Intravenous, Q4HRS PRN, Noble Salinas M.D.  •  [MAR Hold] LORazepam (ATIVAN) injection 1 mg, 1 mg, Intravenous, Q2HRS PRN, Noble Salinas M.D., 1 mg at 04/11/22 0445      Fluids    Intake/Output Summary (Last 24 hours) at 4/11/2022 1349  Last data filed at 4/11/2022 1258  Gross per 24 hour   Intake 1390 ml   Output --   Net 1390 ml       Laboratory  Recent Labs     04/09/22  0424   WBC 11.4*   RBC 4.12*   HEMOGLOBIN 11.6*   HEMATOCRIT 35.2*   MCV 85.4   MCH 28.2   MCHC 33.0*   RDW 44.3   PLATELETCT 285   MPV 9.4     Recent Labs     04/09/22  0424   SODIUM 135   POTASSIUM 4.5   CHLORIDE 101   CO2 21   GLUCOSE 262*   BUN 41*   CREATININE 0.93   CALCIUM 9.0                   Imaging  MR-BRAIN-WITH & W/O   Final Result         Large irregularly enhancing mass in the right parieto-occipital region with significant surrounding vasogenic edema. The lesion infiltrates the splenium of corpus callosum. Nonenhancing T2 signal abnormality also extends to the left parietal region    across the corpus callosum. There is mild mass effect upon the right lateral ventricle with midline shift to the left measuring 3 to 4 mm.               MR-[facility] BRAIN WITH & W/O   Final Result          Infiltrative mass involving the right parieto-occipital region and in the splenium of corpus callosum with surrounding T2 signal abnormality that extends into the right temporal, parietal, occipital region as well as the left parietal region across the    corpus callosum. 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      CT-HEAD W/O   Final Result         1.  Heterogeneous right parietal mass with associated vasogenic edema resulting in effacement of the posterior right ventricle and 5 mm right to left midline shift. Further characterization with MRI of the brain with contrast recommended.   2.  Atherosclerosis.         CT-CHEST,ABDOMEN,PELVIS WITH    (Results Pending)        Assessment/Plan  * Brain mass- (present on admission)  Assessment & Plan  Complicated by vasogenic edema and a 5 mm right to left shift.   Visual field compromise  Dexamethasone 10mg q6 x12 hours  Then dexamethasone 4mg q6   Keppra 500mg BID  We will need assistance of anesthesia to complete MRI, imaging reordered.   Neurosurgery recs pening imaging. CT C/A/P per Neuro recs.      Acute respiratory failure with hypoxia (HCC)  Assessment & Plan  Possibly sequelae of sedation  SpO2 84% on room air  Supplemental oxygen to maintain SpO2 >92%  monitor    Toxic metabolic encephalopathy  Assessment & Plan  Secondary to brain mass  Haldol 1mg IV every 8 hours     Diabetes type 2, uncontrolled (HCC)- (present on admission)  Assessment & Plan  Regular insulin sliding scale every 6 hours as needed.  Lantus added 4/9, CTM, titrate daily to goal bg     Depression- (present on admission)  Assessment & Plan  Home SSRI       VTE prophylaxis: SCDs/TEDs and enoxaparin ppx    I have performed a physical exam and reviewed and updated ROS and Plan today (4/11/2022). In review of yesterday's note (4/10/2022), there are no changes except as documented above.

## 2022-04-11 NOTE — ANESTHESIA TIME REPORT
Anesthesia Start and Stop Event Times     Date Time Event    4/11/2022 1139 Anesthesia Start     1258 Anesthesia Stop        Responsible Staff  04/11/22    Name Role Begin End    Matthew Cadet M.D. Anesth 1139 1258        Overtime Reason:  no overtime (within assigned shift)    Comments:

## 2022-04-11 NOTE — OR NURSING
Report given No  RN via SBAR reviewed orders and patient history, no questions at this time.  Pt alert to self and area, resp even and nonlabored, in NAD, pt denies pain/nausea at this time. Pt moves all ext, follows commands and verbalized understanding but also very impulsive.  Tech at bedside helping patient to stay calm and in the bed.  Linda made aware patient is very impulsive but redirectable but will need someone at bedside.Family notified via text/phone patient is moving to phase II/room. Will con't to monitor until patient has transitioned.

## 2022-04-11 NOTE — PROGRESS NOTES
Neurosurgery Progress Note    Subjective:  Denies HA    Exam:  More awake, some Kinyarwanda/english  Oriented to self and time, confused to situation/place  Tongue ML, No drift, FCx4    BP  Min: 111/82  Max: 127/66  Pulse  Av  Min: 73  Max: 78  Resp  Av  Min: 18  Max: 18  Temp  Av.5 °C (97.7 °F)  Min: 35.9 °C (96.7 °F)  Max: 36.8 °C (98.2 °F)  SpO2  Av %  Min: 91 %  Max: 91 %    No data recorded    Recent Labs     22   WBC 11.4*   RBC 4.12*   HEMOGLOBIN 11.6*   HEMATOCRIT 35.2*   MCV 85.4   MCH 28.2   MCHC 33.0*   RDW 44.3   PLATELETCT 285   MPV 9.4     Recent Labs     22   SODIUM 135   POTASSIUM 4.5   CHLORIDE 101   CO2 21   GLUCOSE 262*   BUN 41*   CREATININE 0.93   CALCIUM 9.0               Intake/Output                       04/10/22 0700 - 22 0659 22 0700 - 22 0659     2546-0833 2329-6700 Total 0725-9398 7847-3144 Total                 Intake    P.O.  1520  240 1760  --  -- --    P.O. 7588 465 0516 -- -- --    Total Intake 8779 709 8078 -- -- --       Output    Urine  --  -- --  --  -- --    Number of Times Voided 1 x -- 1 x -- -- --    Stool  --  -- --  --  -- --    Number of Times Stooled -- 2 x 2 x 0 x -- 0 x    Total Output -- -- -- -- -- --       Net I/O     1397 204 5410 -- -- --            Intake/Output Summary (Last 24 hours) at 2022 0908  Last data filed at 4/10/2022 2300  Gross per 24 hour   Intake 1460 ml   Output --   Net 1460 ml            • insulin GLARGINE  0.2 Units/kg/day Q EVENING    And   • insulin LISPRO  0.2 Units/kg/day TID AC    And   • insulin LISPRO  2-9 Units 4X/DAY ACHS    And   • dextrose bolus  25 g Q15 MIN PRN   • metoclopramide  10 mg Q4HRS PRN   • atorvastatin  10 mg Nightly   • FLUoxetine  20 mg DAILY   • lisinopril  20 mg DAILY   • omeprazole  40 mg DAILY   • levETIRAcetam  500 mg BID   • haloperidol lactate  1 mg Q8HRS   • enoxaparin (LOVENOX) injection  40 mg DAILY   • dexamethasone  4 mg Q6HRS   • gabapentin   1,200 mg BID   • albuterol  2 Puff Q6HRS PRN   • traMADol  50 mg Q12HRS PRN   • senna-docusate  2 Tablet BID    And   • polyethylene glycol/lytes  1 Packet QDAY PRN    And   • magnesium hydroxide  30 mL QDAY PRN    And   • bisacodyl  10 mg QDAY PRN   • Pharmacy Consult Request  1 Each PHARMACY TO DOSE   • oxyCODONE immediate-release  2.5 mg Q3HRS PRN    Or   • oxyCODONE immediate-release  5 mg Q3HRS PRN    Or   • HYDROmorphone  0.25 mg Q3HRS PRN   • hydrALAZINE  10 mg Q4HRS PRN   • LORazepam  1 mg Q2HRS PRN       Assessment and Plan:  HD#5  Heterogenous mass in the right parietal region    MRI pending still (am today)   CT CAP not yet complete  CT head shows findings likely of GB  Pt discussed with tumor board this am they would like bx depending on MRI results Bx if pt.   bx would be weds this week  Have reached out to Mr. Valenzuela the contact listed no answer at this time      Ok for Dex 4Q6 for now   Keppra 500 BID     Will try to post bx for 13th  To hold time in case they want to move forward with treatment   50 mins in pt care      Joshua                 Continue Decadron/ Keppra  Pt on Lovenox per IM  Surgical plan per Dr. Lewis

## 2022-04-12 PROBLEM — G92.8 TOXIC METABOLIC ENCEPHALOPATHY: Status: RESOLVED | Noted: 2022-04-08 | Resolved: 2022-04-12

## 2022-04-12 LAB
ABO + RH BLD: NORMAL
ABO GROUP BLD: NORMAL
ANION GAP SERPL CALC-SCNC: 13 MMOL/L (ref 7–16)
APTT PPP: 24.8 SEC (ref 24.7–36)
BLD GP AB SCN SERPL QL: NORMAL
BUN SERPL-MCNC: 26 MG/DL (ref 8–22)
CALCIUM SERPL-MCNC: 9.1 MG/DL (ref 8.5–10.5)
CHLORIDE SERPL-SCNC: 101 MMOL/L (ref 96–112)
CO2 SERPL-SCNC: 23 MMOL/L (ref 20–33)
CREAT SERPL-MCNC: 0.87 MG/DL (ref 0.5–1.4)
ERYTHROCYTE [DISTWIDTH] IN BLOOD BY AUTOMATED COUNT: 44 FL (ref 35.9–50)
FLUAV RNA SPEC QL NAA+PROBE: NORMAL
FLUBV RNA SPEC QL NAA+PROBE: NORMAL
GFR SERPLBLD CREATININE-BSD FMLA CKD-EPI: 92 ML/MIN/1.73 M 2
GLUCOSE BLD STRIP.AUTO-MCNC: 118 MG/DL (ref 65–99)
GLUCOSE BLD STRIP.AUTO-MCNC: 159 MG/DL (ref 65–99)
GLUCOSE BLD STRIP.AUTO-MCNC: 171 MG/DL (ref 65–99)
GLUCOSE BLD STRIP.AUTO-MCNC: 219 MG/DL (ref 65–99)
GLUCOSE SERPL-MCNC: 136 MG/DL (ref 65–99)
HCT VFR BLD AUTO: 32.6 % (ref 42–52)
HCT VFR BLD AUTO: 34.3 % (ref 42–52)
HEMOCCULT STL QL: POSITIVE
HGB BLD-MCNC: 10.8 G/DL (ref 14–18)
HGB BLD-MCNC: 10.9 G/DL (ref 14–18)
HGB BLD-MCNC: 10.9 G/DL (ref 14–18)
INR PPP: 1.18 (ref 0.87–1.13)
MCH RBC QN AUTO: 28.1 PG (ref 27–33)
MCHC RBC AUTO-ENTMCNC: 33.1 G/DL (ref 33.7–35.3)
MCV RBC AUTO: 84.7 FL (ref 81.4–97.8)
PLATELET # BLD AUTO: 305 K/UL (ref 164–446)
PMV BLD AUTO: 9.9 FL (ref 9–12.9)
POTASSIUM SERPL-SCNC: 4.2 MMOL/L (ref 3.6–5.5)
PROTHROMBIN TIME: 14.6 SEC (ref 12–14.6)
RBC # BLD AUTO: 3.85 M/UL (ref 4.7–6.1)
RH BLD: NORMAL
SARS-COV-2 RNA RESP QL NAA+PROBE: NORMAL
SODIUM SERPL-SCNC: 137 MMOL/L (ref 135–145)
SPECIMEN SOURCE: NORMAL
WBC # BLD AUTO: 9.6 K/UL (ref 4.8–10.8)

## 2022-04-12 PROCEDURE — 85014 HEMATOCRIT: CPT

## 2022-04-12 PROCEDURE — A9270 NON-COVERED ITEM OR SERVICE: HCPCS | Performed by: INTERNAL MEDICINE

## 2022-04-12 PROCEDURE — 85610 PROTHROMBIN TIME: CPT

## 2022-04-12 PROCEDURE — 85730 THROMBOPLASTIN TIME PARTIAL: CPT

## 2022-04-12 PROCEDURE — 700111 HCHG RX REV CODE 636 W/ 250 OVERRIDE (IP)

## 2022-04-12 PROCEDURE — 80048 BASIC METABOLIC PNL TOTAL CA: CPT

## 2022-04-12 PROCEDURE — 770001 HCHG ROOM/CARE - MED/SURG/GYN PRIV*

## 2022-04-12 PROCEDURE — 302196 LINEN, HYPOALLERGENIC: Performed by: STUDENT IN AN ORGANIZED HEALTH CARE EDUCATION/TRAINING PROGRAM

## 2022-04-12 PROCEDURE — 85027 COMPLETE CBC AUTOMATED: CPT

## 2022-04-12 PROCEDURE — 85018 HEMOGLOBIN: CPT

## 2022-04-12 PROCEDURE — 86850 RBC ANTIBODY SCREEN: CPT

## 2022-04-12 PROCEDURE — 36415 COLL VENOUS BLD VENIPUNCTURE: CPT

## 2022-04-12 PROCEDURE — 700102 HCHG RX REV CODE 250 W/ 637 OVERRIDE(OP): Performed by: INTERNAL MEDICINE

## 2022-04-12 PROCEDURE — 700111 HCHG RX REV CODE 636 W/ 250 OVERRIDE (IP): Performed by: INTERNAL MEDICINE

## 2022-04-12 PROCEDURE — C9113 INJ PANTOPRAZOLE SODIUM, VIA: HCPCS

## 2022-04-12 PROCEDURE — 700102 HCHG RX REV CODE 250 W/ 637 OVERRIDE(OP): Performed by: HOSPITALIST

## 2022-04-12 PROCEDURE — 700111 HCHG RX REV CODE 636 W/ 250 OVERRIDE (IP): Performed by: STUDENT IN AN ORGANIZED HEALTH CARE EDUCATION/TRAINING PROGRAM

## 2022-04-12 PROCEDURE — 700102 HCHG RX REV CODE 250 W/ 637 OVERRIDE(OP): Performed by: STUDENT IN AN ORGANIZED HEALTH CARE EDUCATION/TRAINING PROGRAM

## 2022-04-12 PROCEDURE — 86900 BLOOD TYPING SEROLOGIC ABO: CPT

## 2022-04-12 PROCEDURE — 99233 SBSQ HOSP IP/OBS HIGH 50: CPT | Performed by: STUDENT IN AN ORGANIZED HEALTH CARE EDUCATION/TRAINING PROGRAM

## 2022-04-12 PROCEDURE — 82962 GLUCOSE BLOOD TEST: CPT | Mod: 91

## 2022-04-12 PROCEDURE — 86901 BLOOD TYPING SEROLOGIC RH(D): CPT

## 2022-04-12 PROCEDURE — A9270 NON-COVERED ITEM OR SERVICE: HCPCS | Performed by: STUDENT IN AN ORGANIZED HEALTH CARE EDUCATION/TRAINING PROGRAM

## 2022-04-12 PROCEDURE — 82272 OCCULT BLD FECES 1-3 TESTS: CPT

## 2022-04-12 RX ORDER — DIPHENHYDRAMINE HYDROCHLORIDE 50 MG/ML
25 INJECTION INTRAMUSCULAR; INTRAVENOUS EVERY 8 HOURS PRN
Status: DISCONTINUED | OUTPATIENT
Start: 2022-04-12 | End: 2022-04-14 | Stop reason: HOSPADM

## 2022-04-12 RX ORDER — CHOLECALCIFEROL (VITAMIN D3) 125 MCG
5 CAPSULE ORAL NIGHTLY
Status: DISCONTINUED | OUTPATIENT
Start: 2022-04-12 | End: 2022-04-14 | Stop reason: HOSPADM

## 2022-04-12 RX ORDER — PANTOPRAZOLE SODIUM 40 MG/10ML
40 INJECTION, POWDER, LYOPHILIZED, FOR SOLUTION INTRAVENOUS 2 TIMES DAILY
Status: DISCONTINUED | OUTPATIENT
Start: 2022-04-12 | End: 2022-04-14 | Stop reason: HOSPADM

## 2022-04-12 RX ADMIN — INSULIN LISPRO 6 UNITS: 100 INJECTION, SOLUTION INTRAVENOUS; SUBCUTANEOUS at 17:05

## 2022-04-12 RX ADMIN — INSULIN LISPRO 2 UNITS: 100 INJECTION, SOLUTION INTRAVENOUS; SUBCUTANEOUS at 10:47

## 2022-04-12 RX ADMIN — Medication 5 MG: at 20:59

## 2022-04-12 RX ADMIN — DEXAMETHASONE SODIUM PHOSPHATE 4 MG: 4 INJECTION, SOLUTION INTRA-ARTICULAR; INTRALESIONAL; INTRAMUSCULAR; INTRAVENOUS; SOFT TISSUE at 00:37

## 2022-04-12 RX ADMIN — INSULIN LISPRO 2 UNITS: 100 INJECTION, SOLUTION INTRAVENOUS; SUBCUTANEOUS at 06:40

## 2022-04-12 RX ADMIN — LORAZEPAM 1 MG: 2 INJECTION INTRAMUSCULAR; INTRAVENOUS at 23:09

## 2022-04-12 RX ADMIN — DEXAMETHASONE SODIUM PHOSPHATE 4 MG: 4 INJECTION, SOLUTION INTRA-ARTICULAR; INTRALESIONAL; INTRAMUSCULAR; INTRAVENOUS; SOFT TISSUE at 06:31

## 2022-04-12 RX ADMIN — HALOPERIDOL LACTATE 1 MG: 5 INJECTION, SOLUTION INTRAMUSCULAR at 22:05

## 2022-04-12 RX ADMIN — LEVETIRACETAM 500 MG: 500 TABLET, FILM COATED ORAL at 17:10

## 2022-04-12 RX ADMIN — DEXAMETHASONE SODIUM PHOSPHATE 4 MG: 4 INJECTION, SOLUTION INTRA-ARTICULAR; INTRALESIONAL; INTRAMUSCULAR; INTRAVENOUS; SOFT TISSUE at 17:23

## 2022-04-12 RX ADMIN — LORAZEPAM 1 MG: 2 INJECTION INTRAMUSCULAR; INTRAVENOUS at 15:18

## 2022-04-12 RX ADMIN — FLUOXETINE 20 MG: 20 CAPSULE ORAL at 06:29

## 2022-04-12 RX ADMIN — LEVETIRACETAM 500 MG: 500 TABLET, FILM COATED ORAL at 06:30

## 2022-04-12 RX ADMIN — PANTOPRAZOLE SODIUM 40 MG: 40 INJECTION, POWDER, LYOPHILIZED, FOR SOLUTION INTRAVENOUS at 17:23

## 2022-04-12 RX ADMIN — LISINOPRIL 20 MG: 20 TABLET ORAL at 06:30

## 2022-04-12 RX ADMIN — HALOPERIDOL LACTATE 1 MG: 5 INJECTION, SOLUTION INTRAMUSCULAR at 06:30

## 2022-04-12 RX ADMIN — OXYCODONE HYDROCHLORIDE 5 MG: 5 TABLET ORAL at 23:58

## 2022-04-12 RX ADMIN — DEXAMETHASONE SODIUM PHOSPHATE 4 MG: 4 INJECTION, SOLUTION INTRA-ARTICULAR; INTRALESIONAL; INTRAMUSCULAR; INTRAVENOUS; SOFT TISSUE at 12:57

## 2022-04-12 RX ADMIN — DIPHENHYDRAMINE HYDROCHLORIDE 25 MG: 50 INJECTION INTRAMUSCULAR; INTRAVENOUS at 15:44

## 2022-04-12 RX ADMIN — INSULIN LISPRO 3 UNITS: 100 INJECTION, SOLUTION INTRAVENOUS; SUBCUTANEOUS at 17:06

## 2022-04-12 RX ADMIN — INSULIN LISPRO 6 UNITS: 100 INJECTION, SOLUTION INTRAVENOUS; SUBCUTANEOUS at 10:47

## 2022-04-12 RX ADMIN — ATORVASTATIN CALCIUM 10 MG: 10 TABLET, FILM COATED ORAL at 21:00

## 2022-04-12 RX ADMIN — HALOPERIDOL LACTATE 1 MG: 5 INJECTION, SOLUTION INTRAMUSCULAR at 12:58

## 2022-04-12 RX ADMIN — PANTOPRAZOLE SODIUM 40 MG: 40 INJECTION, POWDER, LYOPHILIZED, FOR SOLUTION INTRAVENOUS at 06:30

## 2022-04-12 RX ADMIN — DEXAMETHASONE SODIUM PHOSPHATE 4 MG: 4 INJECTION, SOLUTION INTRA-ARTICULAR; INTRALESIONAL; INTRAMUSCULAR; INTRAVENOUS; SOFT TISSUE at 23:58

## 2022-04-12 ASSESSMENT — COGNITIVE AND FUNCTIONAL STATUS - GENERAL
DAILY ACTIVITIY SCORE: 19
HELP NEEDED FOR BATHING: A LITTLE
WALKING IN HOSPITAL ROOM: A LOT
CLIMB 3 TO 5 STEPS WITH RAILING: A LOT
MOBILITY SCORE: 17
PERSONAL GROOMING: A LITTLE
TOILETING: A LITTLE
SUGGESTED CMS G CODE MODIFIER DAILY ACTIVITY: CK
MOVING FROM LYING ON BACK TO SITTING ON SIDE OF FLAT BED: A LITTLE
SUGGESTED CMS G CODE MODIFIER MOBILITY: CK
DRESSING REGULAR UPPER BODY CLOTHING: A LITTLE
DRESSING REGULAR LOWER BODY CLOTHING: A LITTLE
STANDING UP FROM CHAIR USING ARMS: A LOT

## 2022-04-12 ASSESSMENT — ENCOUNTER SYMPTOMS
COUGH: 0
NAUSEA: 0
DIZZINESS: 0
DOUBLE VISION: 0
CHILLS: 0
VOMITING: 0
SPEECH CHANGE: 0
FEVER: 0
MEMORY LOSS: 1
HEADACHES: 1
BLURRED VISION: 0
INSOMNIA: 1
SENSORY CHANGE: 0
NERVOUS/ANXIOUS: 1
SHORTNESS OF BREATH: 0
WEAKNESS: 0
MYALGIAS: 0
HEARTBURN: 0
CONSTIPATION: 1

## 2022-04-12 ASSESSMENT — PAIN DESCRIPTION - PAIN TYPE
TYPE: ACUTE PAIN
TYPE: ACUTE PAIN

## 2022-04-12 NOTE — PROGRESS NOTES
NOC Cross Cover Progress Note      Mr Marcelo is a 71 yo male with past medical history of diabetes, hypertension, & dyslipidemia who was admitted 4/7 w c/o acute headache. He was found to have a right temporal mass concerning for glioblastoma. Neurology following.     I was called this morning at 0200 with report of melena x1; no nausea/retching/hematemesis, abdominal/chest pain, dizziness, or syncope. On lovenox for DVT prophylaxis. No NSAIDs on MAR. Heme occult positive and Hgb 10.8 from 11.6. No evidence supporting history of portal hypertension- low suspicion for esophageal varices. ? Gastric/duodenal ulcer vs gastritis vs malignancy    VS:  T 97.2F. HR 74. /75. Spo2 93% on RA.     Plan: NPO, sips with meds. COD, coags, & trend H&H q8. Protonix 40mg BID. Lovenox held; order for SCDs. Transfuse PRBC if Hgb <7 or unstable. Consider GI consult as clinically indicated.     Linsey Wegener, APRN NOC Hospitalist

## 2022-04-12 NOTE — PROGRESS NOTES
[facility] Medicine Daily Progress Note    Date of Service  4/12/2022    Chief Complaint  Mark Marcelo is a 72 y.o. male admitted 4/7/2022 with headache     Hospital Course  71 yo male with past medical history of type II DM, hypertension, DLD and history of GERD/GI bleed who presented to the hospital on 4/7/2022 with worsening headache. CT on admission showed right temporal mass concerning for GBM. Neurosurgery was consulted and recommended MRI which revealed infiltrative mass involving the right parieto-occipital region and in the splenium of corpus callosum with surrounding T2 signal abnormality that extends into the right temporal, parietal, occipital region as well as the left parietal region across the corpus callosum. 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   CT C/A/P was also done to evaluate for possible primary malignancy of which none was found.     During admission pt was noted to have an episode of melena, he did have a 1 point drop in Hgb and occult positive stool. He was started on Protonix and monitored.       Interval Problem Update  Patient seen and examined, he is awake and alert, oriented to self and time but not situation. Complaining of insomnia and severe anxiety as well as pain with bowel movements/constipation   - occult blood positive, 1 episode of melena, continue to monitor H/H, Protonix, CLD for now   - possible biopsy of mass tomorrow with neurosurgery, NPO at midnight     Attempted to reach SO Mr. Valenzuela, no answer, voicemail full. Will continue to reach out to discuss plan of care moving forward.     I have personally seen and examined the patient at bedside. I discussed the plan of care with patient, bedside RN and .    Consultants/Specialty  neurosurgery    Code Status  Full Code    Disposition  Patient is not medically cleared for discharge.   Anticipate discharge to to home with close outpatient follow-up.  I have placed the appropriate orders for  post-discharge needs.    Review of Systems  Review of Systems   Constitutional: Positive for malaise/fatigue. Negative for chills and fever.   HENT: Negative for congestion.    Eyes: Negative for blurred vision and double vision.   Respiratory: Negative for cough and shortness of breath.    Cardiovascular: Negative for chest pain and leg swelling.   Gastrointestinal: Positive for constipation and melena. Negative for heartburn, nausea and vomiting.   Genitourinary: Negative for dysuria and urgency.   Musculoskeletal: Negative for myalgias.   Neurological: Positive for headaches. Negative for dizziness, sensory change, speech change and weakness.   Psychiatric/Behavioral: Positive for memory loss. The patient is nervous/anxious and has insomnia.         Physical Exam  Temp:  [36.2 °C (97.1 °F)-36.4 °C (97.5 °F)] 36.2 °C (97.1 °F)  Pulse:  [74-94] 78  Resp:  [12-20] 17  BP: (121-155)/(69-92) 121/69  SpO2:  [90 %-100 %] 92 %    Physical Exam  Vitals and nursing note reviewed.   Constitutional:       General: He is not in acute distress.     Appearance: He is obese. He is not ill-appearing or toxic-appearing.   HENT:      Head: Normocephalic and atraumatic.      Mouth/Throat:      Mouth: Mucous membranes are dry.      Pharynx: Oropharynx is clear.   Eyes:      Extraocular Movements: Extraocular movements intact.      Conjunctiva/sclera: Conjunctivae normal.   Cardiovascular:      Rate and Rhythm: Normal rate and regular rhythm.      Heart sounds: No murmur heard.  Pulmonary:      Effort: Pulmonary effort is normal.      Breath sounds: Normal breath sounds.   Abdominal:      General: Bowel sounds are normal. There is no distension.      Palpations: Abdomen is soft.      Tenderness: There is no abdominal tenderness. There is no guarding or rebound.   Musculoskeletal:         General: Normal range of motion.      Cervical back: Neck supple.   Skin:     General: Skin is warm.   Neurological:      Mental Status: He is alert.  He is disoriented.      Cranial Nerves: No cranial nerve deficit.      Coordination: Coordination normal.      Comments: Speech is clear and fluent, moving all extremities, no focal deficits   Psychiatric:      Comments: Anxious affect, confused to situation          Fluids    Intake/Output Summary (Last 24 hours) at 4/12/2022 1222  Last data filed at 4/11/2022 2346  Gross per 24 hour   Intake 410 ml   Output 425 ml   Net -15 ml       Laboratory  Recent Labs     04/12/22 0227 04/12/22  1135   WBC 9.6  --    RBC 3.85*  --    HEMOGLOBIN 10.8* 10.9*   HEMATOCRIT 32.6* 34.3*   MCV 84.7  --    MCH 28.1  --    MCHC 33.1*  --    RDW 44.0  --    PLATELETCT 305  --    MPV 9.9  --      Recent Labs     04/12/22 0227   SODIUM 137   POTASSIUM 4.2   CHLORIDE 101   CO2 23   GLUCOSE 136*   BUN 26*   CREATININE 0.87   CALCIUM 9.1                   Imaging  CT-CHEST,ABDOMEN,PELVIS WITH   Final Result      1.  No primary malignancy or metastatic disease identified in the chest, abdomen, or pelvis.      2.  Bibasilar atelectasis. Patchy groundglass density may be related to small airways disease.      3.  Diverticulosis.      4.  Umbilical hernia contains fat.      5.  Atherosclerosis.         MR-BRAIN-WITH & W/O   Final Result         Large irregularly enhancing mass in the right parieto-occipital region with significant surrounding vasogenic edema. The lesion infiltrates the splenium of corpus callosum. Nonenhancing T2 signal abnormality also extends to the left parietal region    across the corpus callosum. There is mild mass effect upon the right lateral ventricle with midline shift to the left measuring 3 to 4 mm.               [facility]   Final Result         Infiltrative mass involving the right parieto-occipital region and in the splenium of corpus callosum with surrounding T2 signal abnormality that extends into the right temporal, parietal, occipital region as well as the left parietal region across the     corpus callosum. 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      CT-HEAD W/O   Final Result         1.  Heterogeneous right parietal mass with associated vasogenic edema resulting in effacement of the posterior right ventricle and 5 mm right to left midline shift. Further characterization with MRI of the brain with contrast recommended.   2.  Atherosclerosis.              Assessment/Plan  * Brain mass- (present on admission)  Assessment & Plan  MRI showing infiltrative mass in the right parieto-occipital region complicated by vasogenic edema and a 5 mm right to left shift, consistent with high-grade glioma   Visual field compromise  dexamethasone 4mg q6   Keppra 500mg BID  CT C/A/P without obvious primary malignancy seen   Neurosurgery following, possible biopsy tomorrow pending further discussion with patients SO   NPO at midnight     Acute respiratory failure with hypoxia (HCC)  Assessment & Plan  Possibly sequelae of sedation  SpO2 84% on room air  Supplemental oxygen to maintain SpO2 >92%  Now resolved     Encephalopathy acute  Assessment & Plan  Secondary to brain mass  Haldol 1mg IV every 8 hours   Seizure aspiration and fall precautions   Neurosurgery following   Continue steroids and keppra     Diabetes type 2, uncontrolled (HCC)- (present on admission)  Assessment & Plan  Regular insulin sliding scale every 6 hours as needed  Lantus added 4/9, CTM, titrate daily to goal bg     Depression- (present on admission)  Assessment & Plan  Home SSRI       VTE prophylaxis: SCDs/TEDs    I have performed a physical exam and reviewed and updated ROS and Plan today (4/12/2022). In review of yesterday's note (4/11/2022), there are no changes except as documented above.

## 2022-04-12 NOTE — HOSPITAL COURSE
71 yo male with past medical history of type II DM, hypertension, DLD and history of GERD/GI bleed who presented to the hospital on 4/7/2022 with worsening headache. CT on admission showed right temporal mass concerning for GBM. Neurosurgery was consulted and recommended MRI which revealed infiltrative mass involving the right parieto-occipital region and in the splenium of corpus callosum with surrounding T2 signal abnormality that extends into the right temporal, parietal, occipital region as well as the left parietal region across the corpus callosum. 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   CT C/A/P was also done to evaluate for possible primary malignancy of which none was found.     During admission pt was noted to have an episode of melena, he did have a 1 point drop in Hgb and occult positive stool. He was started on Protonix and monitored.

## 2022-04-12 NOTE — PROGRESS NOTES
"Neurosurgery Progress Note    Subjective:  \"I can't sleep I need to go home\"    Exam:  More awake, some Occitan/english  Oriented to self and time, confused to situation/place  Tongue ML, No drift, FCx4    BP  Min: 121/69  Max: 155/92  Pulse  Av.5  Min: 74  Max: 94  Resp  Av.8  Min: 12  Max: 20  Temp  Av.3 °C (97.3 °F)  Min: 36.2 °C (97.1 °F)  Max: 36.4 °C (97.5 °F)  SpO2  Av %  Min: 90 %  Max: 100 %    No data recorded    Recent Labs     22   WBC 9.6   RBC 3.85*   HEMOGLOBIN 10.8*   HEMATOCRIT 32.6*   MCV 84.7   MCH 28.1   MCHC 33.1*   RDW 44.0   PLATELETCT 305   MPV 9.9     Recent Labs     22   SODIUM 137   POTASSIUM 4.2   CHLORIDE 101   CO2 23   GLUCOSE 136*   BUN 26*   CREATININE 0.87   CALCIUM 9.1               Intake/Output                       22 0700 - 22 0659 22 07 - 22 0659     2619-1080 1320-2054 Total  3418-5697 Total                 Intake    I.V.  410  -- 410  --  -- --    Propofol Volume 10 -- 10 -- -- --    Volume (mL) (electrolyte-A (PLASMALYTE-A) infusion) 400 -- 400 -- -- --    Total Intake 410 -- 410 -- -- --       Output    Urine  --  425 425  --  -- --    Number of Times Voided -- 2 x 2 x -- -- --    Urine Void (mL) -- 425 425 -- -- --    Stool  --  -- --  --  -- --    Number of Times Stooled 0 x 1 x 1 x 0 x -- 0 x    Total Output -- 425 425 -- -- --       Net I/O     410 -425 -15 -- -- --            Intake/Output Summary (Last 24 hours) at 2022 0958  Last data filed at 2022 2346  Gross per 24 hour   Intake 410 ml   Output 425 ml   Net -15 ml            • pantoprazole  40 mg BID   • insulin GLARGINE  0.2 Units/kg/day Q EVENING    And   • insulin LISPRO  0.2 Units/kg/day TID AC    And   • insulin LISPRO  2-9 Units 4X/DAY ACHS    And   • dextrose bolus  25 g Q15 MIN PRN   • metoclopramide  10 mg Q4HRS PRN   • atorvastatin  10 mg Nightly   • FLUoxetine  20 mg DAILY   • lisinopril  20 mg DAILY   • " levETIRAcetam  500 mg BID   • haloperidol lactate  1 mg Q8HRS   • [Held by provider] enoxaparin (LOVENOX) injection  40 mg DAILY   • dexamethasone  4 mg Q6HRS   • gabapentin  1,200 mg BID   • albuterol  2 Puff Q6HRS PRN   • traMADol  50 mg Q12HRS PRN   • senna-docusate  2 Tablet BID    And   • polyethylene glycol/lytes  1 Packet QDAY PRN    And   • magnesium hydroxide  30 mL QDAY PRN    And   • bisacodyl  10 mg QDAY PRN   • Pharmacy Consult Request  1 Each PHARMACY TO DOSE   • oxyCODONE immediate-release  2.5 mg Q3HRS PRN    Or   • oxyCODONE immediate-release  5 mg Q3HRS PRN    Or   • HYDROmorphone  0.25 mg Q3HRS PRN   • hydrALAZINE  10 mg Q4HRS PRN   • LORazepam  1 mg Q2HRS PRN       Assessment and Plan:  HD#6  Heterogenous mass in the right parietal region    MRI & CT CAP completed. Likely glioma  No obvious primary met     Have reached out to Mr. Valenzuela the contact listed no answer at this time      Ok for Dex 4Q6 for now   Keppra 500 BID   plan for biopsy, tentatively tomorrow. NPO at [facility] tonight   Continue Decadron/ Keppra    Hold lovenox

## 2022-04-12 NOTE — PROGRESS NOTES
0200: This RN noticed pt to have had a black, tarry BM in toilet. On call Hospitalist Linsey Wegener APRN paged regarding change in stool color. Orders received from Wegener APRN.    0325: Lab paged this RN regarding positive occult blood stool result. Wegener APRN paged regarding positive result. New orders received for NPO status, Protonix IV, and hold of Lovenox. Will continue to monitor.

## 2022-04-13 PROBLEM — J96.01 ACUTE RESPIRATORY FAILURE WITH HYPOXIA (HCC): Status: RESOLVED | Noted: 2022-04-08 | Resolved: 2022-04-13

## 2022-04-13 LAB
ANION GAP SERPL CALC-SCNC: 11 MMOL/L (ref 7–16)
BUN SERPL-MCNC: 31 MG/DL (ref 8–22)
CALCIUM SERPL-MCNC: 8.8 MG/DL (ref 8.5–10.5)
CHLORIDE SERPL-SCNC: 102 MMOL/L (ref 96–112)
CO2 SERPL-SCNC: 24 MMOL/L (ref 20–33)
CREAT SERPL-MCNC: 0.62 MG/DL (ref 0.5–1.4)
ERYTHROCYTE [DISTWIDTH] IN BLOOD BY AUTOMATED COUNT: 44.1 FL (ref 35.9–50)
FLUAV RNA SPEC QL NAA+PROBE: NEGATIVE
FLUBV RNA SPEC QL NAA+PROBE: NEGATIVE
GFR SERPLBLD CREATININE-BSD FMLA CKD-EPI: 102 ML/MIN/1.73 M 2
GLUCOSE BLD STRIP.AUTO-MCNC: 112 MG/DL (ref 65–99)
GLUCOSE BLD STRIP.AUTO-MCNC: 117 MG/DL (ref 65–99)
GLUCOSE BLD STRIP.AUTO-MCNC: 176 MG/DL (ref 65–99)
GLUCOSE BLD STRIP.AUTO-MCNC: 271 MG/DL (ref 65–99)
GLUCOSE SERPL-MCNC: 125 MG/DL (ref 65–99)
HCT VFR BLD AUTO: 32.5 % (ref 42–52)
HGB BLD-MCNC: 10.5 G/DL (ref 14–18)
HGB BLD-MCNC: 10.9 G/DL (ref 14–18)
HGB BLD-MCNC: 11.4 G/DL (ref 14–18)
MCH RBC QN AUTO: 28.6 PG (ref 27–33)
MCHC RBC AUTO-ENTMCNC: 33.5 G/DL (ref 33.7–35.3)
MCV RBC AUTO: 85.3 FL (ref 81.4–97.8)
PLATELET # BLD AUTO: 279 K/UL (ref 164–446)
PMV BLD AUTO: 9.5 FL (ref 9–12.9)
POTASSIUM SERPL-SCNC: 4 MMOL/L (ref 3.6–5.5)
RBC # BLD AUTO: 3.81 M/UL (ref 4.7–6.1)
SARS-COV-2 RNA RESP QL NAA+PROBE: NOTDETECTED
SODIUM SERPL-SCNC: 137 MMOL/L (ref 135–145)
SPECIMEN SOURCE: NORMAL
WBC # BLD AUTO: 8.4 K/UL (ref 4.8–10.8)

## 2022-04-13 PROCEDURE — 700111 HCHG RX REV CODE 636 W/ 250 OVERRIDE (IP): Performed by: INTERNAL MEDICINE

## 2022-04-13 PROCEDURE — 80048 BASIC METABOLIC PNL TOTAL CA: CPT

## 2022-04-13 PROCEDURE — 700102 HCHG RX REV CODE 250 W/ 637 OVERRIDE(OP): Performed by: INTERNAL MEDICINE

## 2022-04-13 PROCEDURE — A9270 NON-COVERED ITEM OR SERVICE: HCPCS | Performed by: INTERNAL MEDICINE

## 2022-04-13 PROCEDURE — 700102 HCHG RX REV CODE 250 W/ 637 OVERRIDE(OP): Performed by: STUDENT IN AN ORGANIZED HEALTH CARE EDUCATION/TRAINING PROGRAM

## 2022-04-13 PROCEDURE — 85027 COMPLETE CBC AUTOMATED: CPT

## 2022-04-13 PROCEDURE — C9113 INJ PANTOPRAZOLE SODIUM, VIA: HCPCS

## 2022-04-13 PROCEDURE — A9270 NON-COVERED ITEM OR SERVICE: HCPCS | Performed by: STUDENT IN AN ORGANIZED HEALTH CARE EDUCATION/TRAINING PROGRAM

## 2022-04-13 PROCEDURE — 82962 GLUCOSE BLOOD TEST: CPT | Mod: 91

## 2022-04-13 PROCEDURE — 99232 SBSQ HOSP IP/OBS MODERATE 35: CPT | Performed by: STUDENT IN AN ORGANIZED HEALTH CARE EDUCATION/TRAINING PROGRAM

## 2022-04-13 PROCEDURE — 36415 COLL VENOUS BLD VENIPUNCTURE: CPT

## 2022-04-13 PROCEDURE — 85018 HEMOGLOBIN: CPT

## 2022-04-13 PROCEDURE — 770001 HCHG ROOM/CARE - MED/SURG/GYN PRIV*

## 2022-04-13 PROCEDURE — C9803 HOPD COVID-19 SPEC COLLECT: HCPCS | Performed by: STUDENT IN AN ORGANIZED HEALTH CARE EDUCATION/TRAINING PROGRAM

## 2022-04-13 PROCEDURE — 700111 HCHG RX REV CODE 636 W/ 250 OVERRIDE (IP)

## 2022-04-13 PROCEDURE — 0240U HCHG SARS-COV-2 COVID-19 NFCT DS RESP RNA 3 TRGT MIC: CPT

## 2022-04-13 RX ORDER — LORAZEPAM 2 MG/ML
1 INJECTION INTRAMUSCULAR
Refills: 0 | Status: SHIPPED
Start: 2022-04-13 | End: 2022-04-15

## 2022-04-13 RX ORDER — INSULIN LISPRO 100 [IU]/ML
6 INJECTION, SOLUTION INTRAVENOUS; SUBCUTANEOUS
Status: SHIPPED
Start: 2022-04-13

## 2022-04-13 RX ORDER — DEXAMETHASONE 4 MG/1
8 TABLET ORAL 2 TIMES DAILY
Qty: 10 TABLET | Refills: 0 | Status: SHIPPED | OUTPATIENT
Start: 2022-04-13 | End: 2022-04-13

## 2022-04-13 RX ORDER — LEVETIRACETAM 500 MG/1
500 TABLET ORAL 2 TIMES DAILY
Qty: 60 TABLET | Refills: 0 | Status: SHIPPED | OUTPATIENT
Start: 2022-04-13

## 2022-04-13 RX ORDER — DEXAMETHASONE SODIUM PHOSPHATE 4 MG/ML
4 INJECTION, SOLUTION INTRA-ARTICULAR; INTRALESIONAL; INTRAMUSCULAR; INTRAVENOUS; SOFT TISSUE EVERY 6 HOURS
Refills: 0 | Status: SHIPPED
Start: 2022-04-13

## 2022-04-13 RX ADMIN — DEXAMETHASONE SODIUM PHOSPHATE 4 MG: 4 INJECTION, SOLUTION INTRA-ARTICULAR; INTRALESIONAL; INTRAMUSCULAR; INTRAVENOUS; SOFT TISSUE at 06:24

## 2022-04-13 RX ADMIN — INSULIN LISPRO 2 UNITS: 100 INJECTION, SOLUTION INTRAVENOUS; SUBCUTANEOUS at 16:55

## 2022-04-13 RX ADMIN — INSULIN LISPRO 6 UNITS: 100 INJECTION, SOLUTION INTRAVENOUS; SUBCUTANEOUS at 16:53

## 2022-04-13 RX ADMIN — HALOPERIDOL LACTATE 1 MG: 5 INJECTION, SOLUTION INTRAMUSCULAR at 22:48

## 2022-04-13 RX ADMIN — SENNOSIDES AND DOCUSATE SODIUM 2 TABLET: 50; 8.6 TABLET ORAL at 17:16

## 2022-04-13 RX ADMIN — LORAZEPAM 1 MG: 2 INJECTION INTRAMUSCULAR; INTRAVENOUS at 09:42

## 2022-04-13 RX ADMIN — LEVETIRACETAM 500 MG: 500 TABLET, FILM COATED ORAL at 17:09

## 2022-04-13 RX ADMIN — PANTOPRAZOLE SODIUM 40 MG: 40 INJECTION, POWDER, LYOPHILIZED, FOR SOLUTION INTRAVENOUS at 06:24

## 2022-04-13 RX ADMIN — LORAZEPAM 1 MG: 2 INJECTION INTRAMUSCULAR; INTRAVENOUS at 07:19

## 2022-04-13 RX ADMIN — LORAZEPAM 1 MG: 2 INJECTION INTRAMUSCULAR; INTRAVENOUS at 18:17

## 2022-04-13 RX ADMIN — LEVETIRACETAM 500 MG: 500 TABLET, FILM COATED ORAL at 06:25

## 2022-04-13 RX ADMIN — DEXAMETHASONE SODIUM PHOSPHATE 4 MG: 4 INJECTION, SOLUTION INTRA-ARTICULAR; INTRALESIONAL; INTRAMUSCULAR; INTRAVENOUS; SOFT TISSUE at 13:16

## 2022-04-13 RX ADMIN — LORAZEPAM 1 MG: 2 INJECTION INTRAMUSCULAR; INTRAVENOUS at 20:33

## 2022-04-13 RX ADMIN — PANTOPRAZOLE SODIUM 40 MG: 40 INJECTION, POWDER, LYOPHILIZED, FOR SOLUTION INTRAVENOUS at 17:16

## 2022-04-13 RX ADMIN — DEXAMETHASONE SODIUM PHOSPHATE 4 MG: 4 INJECTION, SOLUTION INTRA-ARTICULAR; INTRALESIONAL; INTRAMUSCULAR; INTRAVENOUS; SOFT TISSUE at 17:09

## 2022-04-13 RX ADMIN — LISINOPRIL 20 MG: 20 TABLET ORAL at 06:25

## 2022-04-13 RX ADMIN — HALOPERIDOL LACTATE 1 MG: 5 INJECTION, SOLUTION INTRAMUSCULAR at 13:16

## 2022-04-13 RX ADMIN — INSULIN LISPRO 5 UNITS: 100 INJECTION, SOLUTION INTRAVENOUS; SUBCUTANEOUS at 21:18

## 2022-04-13 RX ADMIN — Medication 5 MG: at 21:12

## 2022-04-13 RX ADMIN — ATORVASTATIN CALCIUM 10 MG: 10 TABLET, FILM COATED ORAL at 21:12

## 2022-04-13 RX ADMIN — HALOPERIDOL LACTATE 1 MG: 5 INJECTION, SOLUTION INTRAMUSCULAR at 06:24

## 2022-04-13 ASSESSMENT — PAIN DESCRIPTION - PAIN TYPE: TYPE: ACUTE PAIN

## 2022-04-13 NOTE — DISCHARGE PLANNING
Anticipated Discharge Disposition:   TBD    Action:    Spoke with Kassi Mcleod with ethics committee with Dr. Flores present and informed her that I have not been successful in finding family and pt has been with his s/o for 42 years.  She recommended that pts s/o Jose Valenzuela be patient's decision maker.  Information was given to Dr. Granados.    NOK search:    Beti Chang  3-61  [phone]  No answer, left vm on  and .    Suresh [facility]  2-31  [phone]  # disconnected    Aaliyah Patel [facility]  336  [phone]  # disconnected    Katrina SUSANNAH Davis  10-70  [phone]  No answer, left vm on  and .    Clint Dunne  857  [phone]  Not accepting calls.    Associates:  Abby Valenzuela    [phone] no answer, left vm   [phone] answered telephone.  He is in [place] since yesterday and staying at a motel.  I advised him that I would text the providers now and they will be reaching out to speak to him asap.  Voate msg to Dr. Granados and MICHAEL Guaman sent.    Florinda Balbuena  3-89  [phone]  No answer, left vm on .    Barriers to Discharge:    Medical clearance    Plan:    Provide transitional care coordination.

## 2022-04-13 NOTE — DISCHARGE SUMMARY
Discharge Summary    CHIEF COMPLAINT ON ADMISSION  Chief Complaint   Patient presents with   • Headache     Pt reports frontal headache for approx 12 hrs, progressively getting worse    • Eye Pain     Pt reports pain behind right eye for approx 12 hrs       Reason for Admission  HA, ABD Pain     CODE STATUS  Full Code    HPI & HOSPITAL COURSE  This is a 72 y.o. male here with past medical history of type II DM, hypertension, DLD and history of GERD/GI bleed per patient who presented to the hospital on 4/7/2022 with worsening headache. CT on admission showed right temporal mass concerning for glioblastoma. Neurosurgery was consulted and recommended MRI which revealed infiltrative mass involving the right parieto-occipital region and in the splenium of corpus callosum with surrounding T2 signal abnormality that extends into the right temporal, parietal, occipital region as well as the left parietal region across the corpus callosum. 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   CT C/A/P was also done to evaluate for possible primary malignancy of which none was found. Biopsy was planned however patient and his partner declined and want to pursue biopsy and treatment in [place] which is their place of residence. Case was discussed with Neurosurgeon on call in [facility] who reviewed the case and imaging and agreed to patient transfer for care.     During admission pt was noted to have an episode of melena, he did have a 1 point drop in Hgb and occult positive stool. He was started on Protonix without any further evidence of bleeding and stabilization of his hemoglobin and hematocrit. He will require further evaluation for this as well upon his return. We recommended abstinence from all NSAIDs and to continue his PPI therapy.    He will be discharged with keppra for seizure prophylaxis as well as continued decadron.     Therefore, he is discharged in fair and stable condition to a short-term general hosptial  for inpatient care.    The patient met 2-midnight criteria for an inpatient stay at the time of discharge.      FOLLOW UP ITEMS POST DISCHARGE  Hemoglobin/hematocrit  Brain mass   Chronic medical conditions     DISCHARGE DIAGNOSES  Principal Problem:    Brain mass POA: Yes  Active Problems:    Depression POA: Yes    Diabetes type 2, uncontrolled (HCC) POA: Yes      Overview: yesynot at goal hisotrically. Currently worse than usual due to prednisone       Rx by ophthalmology. Increase meds and RTC 2-3 weeks. Check sugars bid.    Encephalopathy acute POA: Unknown  Resolved Problems:    Acute respiratory failure with hypoxia (HCC) POA: Unknown      FOLLOW UP  No future appointments.  LOAN [facility]  [address]  [id number]  [facility] [id number] [phone]      ASAP      MEDICATIONS ON DISCHARGE     Medication List      START taking these medications      Instructions   dexamethasone 4 MG/ML Soln  Commonly known as: DECADRON   Infuse 1 mL into a venous catheter every 6 hours.  Dose: 4 mg     insulin GLARGINE 100 UNIT/ML Soln  Commonly known as: Lantus,Semglee   Inject 17 Units under the skin every evening.  Dose: 17 Units     insulin lispro 100 UNIT/ML Sopn injection PEN  Commonly known as: HumaLOG,AdmeLOG   Inject 6 Units under the skin 3 times a day before meals.  Dose: 6 Units     levETIRAcetam 500 MG Tabs  Commonly known as: KEPPRA  Notes to patient: Take as prescribed   Take 1 Tablet by mouth 2 times a day.  Dose: 500 mg     LORazepam 2 MG/ML Soln  Commonly known as: ATIVAN   Infuse 0.5 mL into a venous catheter every 2 hours as needed for up to 2 days.  Dose: 1 mg        CONTINUE taking these medications      Instructions   atorvastatin 10 MG Tabs  Commonly known as: LIPITOR  Notes to patient: Take as prescribed   Take 10 mg by mouth every evening.  Dose: 10 mg     FLUoxetine 10 MG Caps  Commonly known as: PROZAC  Notes to patient: Take as prescribed   Take 20 mg by mouth every day.  Dose: 20 mg      gabapentin 600 MG tablet  Commonly known as: NEURONTIN  Notes to patient: Take as prescribed   Take 1,200 mg by mouth 2 times a day.  Dose: 1,200 mg     glipiZIDE 10 MG Tabs  Commonly known as: GLUCOTROL  Notes to patient: Take as prescribed   Take 10 mg by mouth 2 times a day.  Dose: 10 mg     hydrOXYzine HCl 10 MG Tabs  Commonly known as: ATARAX  Notes to patient: Take as prescribed   Take 10 mg by mouth every 8 hours as needed for Anxiety.  Dose: 10 mg     lisinopril 20 MG Tabs  Commonly known as: PRINIVIL  Notes to patient: Take as prescribed   Take 20 mg by mouth every day.  Dose: 20 mg     metformin 1000 MG tablet  Commonly known as: GLUCOPHAGE  Notes to patient: Take as prescribed   Take 1 Tab by mouth 2 times a day, with meals.  Dose: 1,000 mg     omeprazole 40 MG delayed-release capsule  Commonly known as: PRILOSEC  Notes to patient: Take as prescribed   Take 40 mg by mouth every day.  Dose: 40 mg     ProAir  (90 Base) MCG/ACT Aers inhalation aerosol  Generic drug: albuterol  Notes to patient: Take as prescribed   Inhale 2 Puffs every 6 hours as needed for Shortness of Breath.  Dose: 2 Puff     venlafaxine XR 75 MG Cp24  Commonly known as: EFFEXOR XR  Notes to patient: Take as prescribed   Take 75 mg by mouth every day.  Dose: 75 mg        STOP taking these medications    Belsomra 10 MG Tabs  Generic drug: Suvorexant     traMADol 50 MG Tabs  Commonly known as: ULTRAM            Allergies  Allergies   Allergen Reactions   • Acetaminophen Vomiting   • Advil [Ibuprofen]        DIET  Orders Placed This Encounter   Procedures   • Diet Order Diet: Regular     Standing Status:   Standing     Number of Occurrences:   1     Order Specific Question:   Diet:     Answer:   Regular [1]       ACTIVITY  As tolerated.      LINES, DRAINS, AND WOUNDS  This is an automated list. Peripheral IVs will be removed prior to discharge.  Peripheral IV 04/11/22 20 G Left Antecubital (Active)   Site Assessment Clean;Dry;Intact  04/13/22 0800   Dressing Type Transparent 04/13/22 0800   Line Status Scrubbed the hub prior to access;Flushed;Saline locked 04/13/22 0800   Dressing Status Clean;Dry;Intact 04/13/22 0800   Dressing Intervention N/A 04/13/22 0800   Infiltration Grading ([facility]) 0 04/13/22 0800   Phlebitis Scale ([facility] Only) 0 04/13/22 0800          Peripheral IV 04/11/22 20 G Left Antecubital (Active)   Site Assessment Clean;Dry;Intact 04/13/22 0800   Dressing Type Transparent 04/13/22 0800   Line Status Scrubbed the hub prior to access;Flushed;Saline locked 04/13/22 0800   Dressing Status Clean;Dry;Intact 04/13/22 0800   Dressing Intervention N/A 04/13/22 0800   Infiltration Grading ([facility], [facility]) 0 04/13/22 0800   Phlebitis Scale ([facility] Only) 0 04/13/22 0800               MENTAL STATUS ON TRANSFER      patient is oriented to self and place, confused to situation, intermittent delirium and encephalopathy likely due to underlying brain mass        CONSULTATIONS  Neurosurgery     PROCEDURES  NA     LABORATORY  Lab Results   Component Value Date    SODIUM 137 04/13/2022    POTASSIUM 4.0 04/13/2022    CHLORIDE 102 04/13/2022    CO2 24 04/13/2022    GLUCOSE 125 (H) 04/13/2022    BUN 31 (H) 04/13/2022    CREATININE 0.62 04/13/2022        Lab Results   Component Value Date    WBC 10.3 04/14/2022    HEMOGLOBIN 11.1 (L) 04/14/2022    HEMATOCRIT 34.5 (L) 04/14/2022    PLATELETCT 311 04/14/2022        Total time of the discharge process exceeds 36 minutes.

## 2022-04-13 NOTE — PROGRESS NOTES
Neurosurgery Progress Note    Subjective:  Partner wants to take pt [place] [facility] [place], poor understanding of expediting bx and care even after lengthy discussion about obtaining bx today with transfer of pt to [facility] for rad/checmo after dx..    Exam:  More awake, some Syriac/english  Oriented to self and time, confused to situation/place  Tongue ML, No drift, FCx4    BP  Min: 102/75  Max: 133/80  Pulse  Av.3  Min: 72  Max: 77  Resp  Av.3  Min: 16  Max: 18  Temp  Av.7 °C (98 °F)  Min: 36.2 °C (97.2 °F)  Max: 37.1 °C (98.8 °F)  SpO2  Av.7 %  Min: 93 %  Max: 94 %    No data recorded    Recent Labs     22  1135 22  0326   WBC 9.6  --   --  8.4   RBC 3.85*  --   --  3.81*   HEMOGLOBIN 10.8* 10.9* 10.9* 10.9*   HEMATOCRIT 32.6* 34.3*  --  32.5*   MCV 84.7  --   --  85.3   MCH 28.1  --   --  28.6   MCHC 33.1*  --   --  33.5*   RDW 44.0  --   --  44.1   PLATELETCT 305  --   --  279   MPV 9.9  --   --  9.5     Recent Labs     22  0326   SODIUM 137 137   POTASSIUM 4.2 4.0   CHLORIDE 101 102   CO2 23 24   GLUCOSE 136* 125*   BUN 26* 31*   CREATININE 0.87 0.62   CALCIUM 9.1 8.8     Recent Labs     22  1135   APTT 24.8   INR 1.18*           Intake/Output                       22 07 - 22 0659 22 07 - 22 0659     [id number] Total  Total                 Intake    P.O.  --  240 240  --  -- --    P.O. -- 240 240 -- -- --    Total Intake -- 240 240 -- -- --       Output    Urine  --  -- --  --  -- --    Number of Times Voided -- 4 x 4 x -- -- --    Stool  --  -- --  --  -- --    Number of Times Stooled 0 x -- 0 x -- -- --    Total Output -- -- -- -- -- --       Net I/O     -- 240 240 -- -- --            Intake/Output Summary (Last 24 hours) at 2022 0847  Last data filed at 2022  Gross per 24 hour   Intake 240 ml   Output --   Net 240 ml            • pantoprazole  40 mg  BID   • melatonin  5 mg Nightly   • diphenhydrAMINE  25 mg Q8HRS PRN   • insulin GLARGINE  0.2 Units/kg/day Q EVENING    And   • insulin LISPRO  0.2 Units/kg/day TID AC    And   • insulin LISPRO  2-9 Units 4X/DAY ACHS    And   • dextrose bolus  25 g Q15 MIN PRN   • metoclopramide  10 mg Q4HRS PRN   • atorvastatin  10 mg Nightly   • FLUoxetine  20 mg DAILY   • lisinopril  20 mg DAILY   • levETIRAcetam  500 mg BID   • haloperidol lactate  1 mg Q8HRS   • [Held by provider] enoxaparin (LOVENOX) injection  40 mg DAILY   • dexamethasone  4 mg Q6HRS   • gabapentin  1,200 mg BID   • albuterol  2 Puff Q6HRS PRN   • traMADol  50 mg Q12HRS PRN   • senna-docusate  2 Tablet BID    And   • polyethylene glycol/lytes  1 Packet QDAY PRN    And   • magnesium hydroxide  30 mL QDAY PRN    And   • bisacodyl  10 mg QDAY PRN   • Pharmacy Consult Request  1 Each PHARMACY TO DOSE   • oxyCODONE immediate-release  2.5 mg Q3HRS PRN    Or   • oxyCODONE immediate-release  5 mg Q3HRS PRN    Or   • HYDROmorphone  0.25 mg Q3HRS PRN   • hydrALAZINE  10 mg Q4HRS PRN   • LORazepam  1 mg Q2HRS PRN       Assessment and Plan:  HD#7  Heterogenous mass in the right parietal region    CAP negative   MRI shows signs of likely GBM  No obvious primary met  Bx canceled this am at partners request     Partner wants to take pt [place] [facility] [place], poor understanding of expediting bx and care even after lengthy discussion about obtaining bx today with transfer of pt to [facility] for rad/checmo after dx. This could have a partial debulking likely not a GTR would need to discuss with TB after bx. At this time no further local need for NSGY ok to DC home with FU per pt with [facility] they would like to go home today     45 mins in dirrect communication with family and hospital team     Joshua

## 2022-04-13 NOTE — DISCHARGE SUMMARY
Discharge Summary    CHIEF COMPLAINT ON ADMISSION  Chief Complaint   Patient presents with   • Headache     Pt reports frontal headache for approx 12 hrs, progressively getting worse    • Eye Pain     Pt reports pain behind right eye for approx 12 hrs       Reason for Admission  HA, ABD Pain     Admission Date  4/7/2022    CODE STATUS  Full Code    HPI & HOSPITAL COURSE  This is a 72 y.o. male here with past medical history of type II DM, hypertension, DLD and history of GERD/GI bleed per patient who presented to the hospital on 4/7/2022 with worsening headache. CT on admission showed right temporal mass concerning for glioblastoma. Neurosurgery was consulted and recommended MRI which revealed infiltrative mass involving the right parieto-occipital region and in the splenium of corpus callosum with surrounding T2 signal abnormality that extends into the right temporal, parietal, occipital region as well as the left parietal region across the corpus callosum. 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   CT C/A/P was also done to evaluate for possible primary malignancy of which none was found. Biopsy was planned however patient and his partner declined and want to pursue biopsy and treatment in [place] which is their place of residence. They were educated on importance of expidited diagnosis and treatment however they still declined and opted for discharge with follow up in their home town upon their arrival back.   During admission pt was noted to have an episode of melena, he did have a 1 point drop in Hgb and occult positive stool. He was started on Protonix without any further evidence of bleeding and stabilization of his hemoglobin and hematocrit. He will require further evaluation for this as well upon his return. We recommended abstinence from all NSAIDs and to continue his PPI therapy.    He will be discharged with kera for seizure prophylaxis as well as decadron. Both the patient and his  significant other were extensively educated on importance of follow up with medical care as soon as possible, for which they verbalized understanding.        Therefore, he is discharged in guarded and stable condition to home with close outpatient follow-up.    The patient met 2-midnight criteria for an inpatient stay at the time of discharge.    Discharge Date  4/13/2022    FOLLOW UP ITEMS POST DISCHARGE  Brain mass, highly suspected to be glioblastoma, need biopsy for confirmation  Melena x 1, no further evidence of GI bleeding, will need to be monitored   Chronic medical issues     DISCHARGE DIAGNOSES  Principal Problem:    Brain mass POA: Yes  Active Problems:    Depression POA: Yes    Diabetes type 2, uncontrolled (HCC) POA: Yes      Overview: yesynot at goal hisotrically. Currently worse than usual due to prednisone       Rx by ophthalmology. Increase meds and RTC 2-3 weeks. Check sugars bid.    Encephalopathy acute POA: Unknown  Resolved Problems:    Acute respiratory failure with hypoxia (HCC) POA: Unknown      FOLLOW UP  No future appointments.  Yessi Hinojosa, A.P.N.  [address]  [address]  [facility] [address]  [phone]      ASA      MEDICATIONS ON DISCHARGE     Medication List      START taking these medications      Instructions   dexamethasone 4 MG Tabs  Commonly known as: DECADRON   Take 2 Tablets by mouth 2 times a day.  Dose: 8 mg     levETIRAcetam 500 MG Tabs  Commonly known as: KEPPRA   Take 1 Tablet by mouth 2 times a day.  Dose: 500 mg        CONTINUE taking these medications      Instructions   atorvastatin 10 MG Tabs  Commonly known as: LIPITOR   Take 10 mg by mouth every evening.  Dose: 10 mg     FLUoxetine 10 MG Caps  Commonly known as: PROZAC   Take 20 mg by mouth every day.  Dose: 20 mg     gabapentin 600 MG tablet  Commonly known as: NEURONTIN   Take 1,200 mg by mouth 2 times a day.  Dose: 1,200 mg     glipiZIDE 10 MG Tabs  Commonly known as: GLUCOTROL   Take 10 mg by mouth 2  times a day.  Dose: 10 mg     hydrOXYzine HCl 10 MG Tabs  Commonly known as: ATARAX   Take 10 mg by mouth every 8 hours as needed for Anxiety.  Dose: 10 mg     lisinopril 20 MG Tabs  Commonly known as: PRINIVIL   Take 20 mg by mouth every day.  Dose: 20 mg     metformin 1000 MG tablet  Commonly known as: GLUCOPHAGE   Take 1 Tab by mouth 2 times a day, with meals.  Dose: 1,000 mg     omeprazole 40 MG delayed-release capsule  Commonly known as: PRILOSEC   Take 40 mg by mouth every day.  Dose: 40 mg     ProAir  (90 Base) MCG/ACT Aers inhalation aerosol  Generic drug: albuterol   Inhale 2 Puffs every 6 hours as needed for Shortness of Breath.  Dose: 2 Puff     venlafaxine XR 75 MG Cp24  Commonly known as: EFFEXOR XR   Take 75 mg by mouth every day.  Dose: 75 mg        STOP taking these medications    Belsomra 10 MG Tabs  Generic drug: Suvorexant     traMADol 50 MG Tabs  Commonly known as: ULTRAM            Allergies  Allergies   Allergen Reactions   • Acetaminophen Vomiting   • Advil [Ibuprofen]        DIET  Orders Placed This Encounter   Procedures   • Diet Order Diet: Regular     Standing Status:   Standing     Number of Occurrences:   1     Order Specific Question:   Diet:     Answer:   Regular [1]       ACTIVITY  As tolerated.      CONSULTATIONS  Neurosurgery     PROCEDURES  NA    LABORATORY  Lab Results   Component Value Date    SODIUM 137 04/13/2022    POTASSIUM 4.0 04/13/2022    CHLORIDE 102 04/13/2022    CO2 24 04/13/2022    GLUCOSE 125 (H) 04/13/2022    BUN 31 (H) 04/13/2022    CREATININE 0.62 04/13/2022        Lab Results   Component Value Date    WBC 8.4 04/13/2022    HEMOGLOBIN 10.9 (L) 04/13/2022    HEMATOCRIT 32.5 (L) 04/13/2022    PLATELETCT 279 04/13/2022        Total time of the discharge process exceeds 36 minutes.

## 2022-04-13 NOTE — DISCHARGE INSTRUCTIONS
Discharge Instructions    Discharged to home by car with relative. Discharged via wheelchair, hospital escort: Yes.  Special equipment needed: Cane    Be sure to schedule a follow-up appointment with your primary care doctor or any specialists as instructed.     Discharge Plan:   Diet Plan: Discussed  Activity Level: Discussed  Confirmed Follow up Appointment: No (Comments) (Patient and family to follow up with provider in [place] area)  Confirmed Symptoms Management: Discussed  Medication Reconciliation Updated: Yes    I understand that a diet low in cholesterol, fat, and sodium is recommended for good health. Unless I have been given specific instructions below for another diet, I accept this instruction as my diet prescription.   Other diet: Regular    Special Instructions: None    · Is patient discharged on Warfarin / Coumadin?   No     Depression / Suicide Risk    As you are discharged from this [facility] facility, it is important to learn how to keep safe from harming yourself.    Recognize the warning signs:  · Abrupt changes in personality, positive or negative- including increase in energy   · Giving away possessions  · Change in eating patterns- significant weight changes-  positive or negative  · Change in sleeping patterns- unable to sleep or sleeping all the time   · Unwillingness or inability to communicate  · Depression  · Unusual sadness, discouragement and loneliness  · Talk of wanting to die  · Neglect of personal appearance   · Rebelliousness- reckless behavior  · Withdrawal from people/activities they love  · Confusion- inability to concentrate     If you or a loved one observes any of these behaviors or has concerns about self-harm, here's what you can do:  · Talk about it- your feelings and reasons for harming yourself  · Remove any means that you might use to hurt yourself (examples: pills, rope, extension cords, firearm)  · Get professional help from the community (Mental Health,  Substance Abuse, psychological counseling)  · Do not be alone:Call your Safe Contact- someone whom you trust who will be there for you.  · Call your local CRISIS HOTLINE 019-3592 or 751-116-8002  · Call your local Children's Mobile Crisis Response Team [place] (901) 475-7292 or www.Splother  · Call the toll free National Suicide Prevention Hotlines   · National Suicide Prevention Lifeline 252-486-MTHR (3409)  · National Hope Line Network 800-SUICIDE (394-0279)      Take medications as directed, new medications are Keppra and decadron, this is for seizure prevention and swelling associated with brain tumor.   As discussed it is imperative that you seek treatment when you return home, I recommend that you call your PCP and go to local hospital to resume care     Glioblastoma       Glioblastoma, also called Grade IV astrocytoma, is a type of brain cancer. Glioblastoma tumors are made up of an overgrowth of normal brain cells. This type of cancer can grow quickly.  What are the causes?  A tumor is formed when normal brain cells grow into a mass of tissue. What causes normal brain cells to grow into a mass of tissue is not known.  What increases the risk?  The following factors may make you more likely to develop this condition:  · Radiation exposure.  · Family history of cancer syndrome, such as Li-Fraumeni syndrome or Turcot syndrome.  · Age. People between the ages of 45 and 70 are more likely to develop this tumor.  What are the signs or symptoms?  Symptoms of this condition may depend on the size and location of the tumor. Symptoms may include:  · Headache, which may be worse in the morning.  · Nausea and vomiting.  · Vision changes.  · Seizures.  · Not being able to walk.  · Weakness or numbness on one side of the body or in an arm or leg.  · Mood changes.  · Problems with memory or thinking.  · Drowsiness.  Symptoms are most commonly caused by increased pressure in the brain.  How is this  diagnosed?  This condition is diagnosed based on a medical history and a physical exam. Brain imaging tests will also be done, such as a CT scan or MRI. A sample of the tumor will be taken and studied in a laboratory (biopsy) to confirm the diagnosis.  How is this treated?  There are several treatment options for this condition. Often, a person will have more than one type of treatment. Treatment may include:  · Surgery to remove as much of the tumor as possible.  · High-energy rays (radiation therapy) to help shrink or kill the tumor.  · Chemotherapy to shrink or kill the tumor. Because normal cells may also be killed, chemotherapy causes many side effects.  · Targeted therapy. This uses substances that damage or kill cancer cells without affecting normal cells.  · Steroid medicine to decrease brain swelling and improve symptoms.  · New treatments through clinical trials.  Follow these instructions at home:  · Take over-the-counter and prescription medicines only as told by your health care provider.  · Consider joining a support group.  · Keep all follow-up visits as told by your health care provider. This is important.  Where to find more information  · National Cancer [place]: https://www.cancer.gov  · American Cancer Society: http://www.cancer.org  Contact a health care provider if:  · Any of your symptoms come back.  · You have diarrhea, vomiting, or abdominal pain.  · You cannot eat or drink what you need.  · You feel weaker and more tired than usual.  · You lose weight without trying.  Get help right away if:  · Your diarrhea, vomiting, or abdominal pain does not go away.  · You have new symptoms, such as vision problems or trouble walking.  · You have a seizure.  · You have bleeding that does not stop.  · You have trouble breathing.  · You have a fever.  Summary  · Glioblastoma, also called Grade IV astrocytoma, is a type of brain cancer. Glioblastoma tumors are made up of an overgrowth of normal brain  cells. This type of cancer can grow quickly.  · This condition is diagnosed based on a medical history and a physical exam. Brain imaging tests will also be done, such as a CT scan or MRI.  · A sample of the tumor will be taken and studied in a laboratory (biopsy) to confirm the diagnosis.  · Treatment may include surgery to remove the tumor as well as radiation therapy and chemotherapy.  This information is not intended to replace advice given to you by your health care provider. Make sure you discuss any questions you have with your health care provider.  Document Released: 12/23/2014 Document Revised: 11/30/2018 Document Reviewed: 01/16/2018  [employer] Patient Education © 2020 [employer]    Levetiracetam tablets  What is this medicine?  LEVETIRACETAM (marimar austin) is an antiepileptic drug. It is used with other medicines to treat certain types of seizures.  This medicine may be used for other purposes; ask your health care provider or pharmacist if you have questions.  COMMON BRAND NAME(S): Susy Manrique  What should I tell my health care provider before I take this medicine?  They need to know if you have any of these conditions:  · kidney disease  · suicidal thoughts, plans, or attempt; a previous suicide attempt by you or a family member  · an unusual or allergic reaction to levetiracetam, other medicines, foods, dyes, or preservatives  · pregnant or trying to get pregnant  · breast-feeding  How should I use this medicine?  Take this medicine by mouth with a glass of water. Follow the directions on the prescription label. Swallow the tablets whole. Do not crush or chew this medicine. You may take this medicine with or without food. Take your doses at regular intervals. Do not take your medicine more often than directed. Do not stop taking this medicine or any of your seizure medicines unless instructed by your doctor or health care professional. Stopping your medicine suddenly can increase your  seizures or their severity.  A special MedGuide will be given to you by the pharmacist with each prescription and refill. Be sure to read this information carefully each time.  Contact your pediatrician or health care professional regarding the use of this medication in children. While this drug may be prescribed for children as young as 4 years of age for selected conditions, precautions do apply.  Overdosage: If you think you have taken too much of this medicine contact a poison control center or emergency room at once.  NOTE: This medicine is only for you. Do not share this medicine with others.  What if I miss a dose?  If you miss a dose, take it as soon as you can. If it is almost time for your next dose, take only that dose. Do not take double or extra doses.  What may interact with this medicine?  This medicine may interact with the following medications:  · carbamazepine  · colesevelam  · probenecid  · sevelamer  This list may not describe all possible interactions. Give your health care provider a list of all the medicines, herbs, non-prescription drugs, or dietary supplements you use. Also tell them if you smoke, drink alcohol, or use illegal drugs. Some items may interact with your medicine.  What should I watch for while using this medicine?  Visit your doctor or health care provider for a regular check on your progress. Wear a medical identification bracelet or chain to say you have epilepsy, and carry a card that lists all your medications.  This medicine may cause serious skin reactions. They can happen weeks to months after starting the medicine. Contact your health care provider right away if you notice fevers or flu-like symptoms with a rash. The rash may be red or purple and then turn into blisters or peeling of the skin. Or, you might notice a red rash with swelling of the face, lips or lymph nodes in your neck or under your arms.  It is important to take this medicine exactly as instructed by  your health care provider. When first starting treatment, your dose may need to be adjusted. It may take weeks or months before your dose is stable. You should contact your doctor or health care provider if your seizures get worse or if you have any new types of seizures.  You may get drowsy or dizzy. Do not drive, use machinery, or do anything that needs mental alertness until you know how this medicine affects you. Do not stand or sit up quickly, especially if you are an older patient. This reduces the risk of dizzy or fainting spells. Alcohol may interfere with the effect of this medicine. Avoid alcoholic drinks.  The use of this medicine may increase the chance of suicidal thoughts or actions. Pay special attention to how you are responding while on this medicine. Any worsening of mood, or thoughts of suicide or dying should be reported to your health care provider right away.  Women who become pregnant while using this medicine may enroll in the [place] American Antiepileptic Drug Pregnancy Registry by calling 1-836.630.1473. This registry collects information about the safety of antiepileptic drug use during pregnancy.  What side effects may I notice from receiving this medicine?  Side effects that you should report to your doctor or health care professional as soon as possible:  · allergic reactions like skin rash, itching or hives, swelling of the face, lips, or tongue  · breathing problems  · dark urine  · general ill feeling or flu-like symptoms  · problems with balance, talking, walking  · rash, fever, and swollen lymph nodes  · redness, blistering, peeling or loosening of the skin, including inside the mouth  · unusually weak or tired  · worsening of mood, thoughts or actions of suicide or dying  · yellowing of the eyes or skin  Side effects that usually do not require medical attention (report to your doctor or health care professional if they continue or are bothersome):  · diarrhea  · dizzy,  drowsy  · headache  · loss of appetite  This list may not describe all possible side effects. Call your doctor for medical advice about side effects. You may report side effects to FDA at 1-575-WNO-8872.  Where should I keep my medicine?  Keep out of reach of children.  Store at room temperature between 15 and 30 degrees C (59 and 86 degrees F). Throw away any unused medicine after the expiration date.  NOTE: This sheet is a summary. It may not cover all possible information. If you have questions about this medicine, talk to your doctor, pharmacist, or health care provider.  © 2020 [employer] (2020-03-20 15:23:36)    Dexamethasone tablets  What is this medicine?  DEXAMETHASONE (dex a METH a sone) is a corticosteroid. It is commonly used to treat inflammation of the skin, joints, lungs, and other organs. Common conditions treated include asthma, allergies, and arthritis. It is also used for other conditions, such as blood disorders and diseases of the adrenal glands.  This medicine may be used for other purposes; ask your health care provider or pharmacist if you have questions.  COMMON BRAND NAME(S): CUSHINGS SYNDROME DIAGNOSTIC, Decadron, Dexabliss, DexPak Jr TaperPak, DexPak TaperPak, Dxevo, HiDex, TaperDex, Zema-Parag, ZoDex, ZonaCort 11 Day, ZonaCort 7 Day  What should I tell my health care provider before I take this medicine?  They need to know if you have any of these conditions:  · Cushing's syndrome  · diabetes  · glaucoma  · heart problems or disease  · high blood pressure  · infection like herpes, measles, tuberculosis, or chickenpox  · kidney disease  · liver disease  · mental problems  · myasthenia gravis  · osteoporosis  · previous heart attack  · seizures  · stomach, ulcer or intestine disease including colitis and diverticulitis  · thyroid problem  · an unusual or allergic reaction to dexamethasone, corticosteroids, other medicines, lactose, foods, dyes, or preservatives  · pregnant or  trying to get pregnant  · breast-feeding  How should I use this medicine?  Take this medicine by mouth with a drink of water. Follow the directions on the prescription label. Take it with food or milk to avoid stomach upset. If you are taking this medicine once a day, take it in the morning. Do not take more medicine than you are told to take. Do not suddenly stop taking your medicine because you may develop a severe reaction. Your doctor will tell you how much medicine to take. If your doctor wants you to stop the medicine, the dose may be slowly lowered over time to avoid any side effects.  Talk to your pediatrician regarding the use of this medicine in children. Special care may be needed.  Patients over 65 years old may have a stronger reaction and need a smaller dose.  Overdosage: If you think you have taken too much of this medicine contact a poison control center or emergency room at once.  NOTE: This medicine is only for you. Do not share this medicine with others.  What if I miss a dose?  If you miss a dose, take it as soon as you can. If it is almost time for your next dose, talk to your doctor or health care professional. You may need to miss a dose or take an extra dose. Do not take double or extra doses without advice.  What may interact with this medicine?  Do not take this medicine with any of the following medications:  · mifepristone, RU-486  · vaccines  This medicine may also interact with the following medications:  · amphotericin B  · antibiotics like clarithromycin, erythromycin, and troleandomycin  · aspirin and aspirin-like drugs  · barbiturates like phenobarbital  · carbamazepine  · cholestyramine  · cholinesterase inhibitors like donepezil, galantamine, rivastigmine, and tacrine  · cyclosporine  · digoxin  · diuretics  · ephedrine  · female hormones, like estrogens or progestins and birth control pills  · indinavir  · isoniazid  · ketoconazole  · medicines for diabetes  · medicines that  improve muscle tone or strength for conditions like myasthenia gravis  · NSAIDs, medicines for pain and inflammation, like ibuprofen or naproxen  · phenytoin  · rifampin  · thalidomide  · warfarin  This list may not describe all possible interactions. Give your health care provider a list of all the medicines, herbs, non-prescription drugs, or dietary supplements you use. Also tell them if you smoke, drink alcohol, or use illegal drugs. Some items may interact with your medicine.  What should I watch for while using this medicine?  Visit your doctor or health care professional for regular checks on your progress. If you are taking this medicine over a prolonged period, carry an identification card with your name and address, the type and dose of your medicine, and your doctor's name and address.  This medicine may increase your risk of getting an infection. Stay away from people who are sick. Tell your doctor or health care professional if you are around anyone with measles or chickenpox.  If you are going to have surgery, tell your doctor or health care professional that you have taken this medicine within the last twelve months.  Ask your doctor or health care professional about your diet. You may need to lower the amount of salt you eat.  This medicine may increase blood sugar. Ask your healthcare provider if changes in diet or medicines are needed if you have diabetes.  What side effects may I notice from receiving this medicine?  Side effects that you should report to your doctor or health care professional as soon as possible:  · allergic reactions like skin rash, itching or hives, swelling of the face, lips, or tongue  · fever, sore throat, sneezing, cough, or other signs of infection, wounds that will not heal  · mental depression, mood swings, mistaken feelings of self importance or of being mistreated  · pain in hips, back, ribs, arms, shoulders, or legs  · redness, blistering, peeling or loosening of the  skin, including inside the mouth  ·   signs and symptoms of high blood sugar such as being more thirsty or hungry or having to urinate more than normal. You may also feel very tired or have blurry vision.  · trouble passing urine  · swelling of feet or lower legs  · unusual bleeding or bruising  Side effects that usually do not require medical attention (report to your doctor or health care professional if they continue or are bothersome):  · headache  · nausea, vomiting  · skin problems, acne, thin and shiny skin  · weight gain  This list may not describe all possible side effects. Call your doctor for medical advice about side effects. You may report side effects to FDA at 6-971-DGN-1778.  Where should I keep my medicine?  Keep out of the reach of children.  Store at room temperature between 20 and 25 degrees C (68 and 77 degrees F). Protect from light. Throw away any unused medicine after the expiration date.  NOTE: This sheet is a summary. It may not cover all possible information. If you have questions about this medicine, talk to your doctor, pharmacist, or health care provider.  © 2020 [employer] (2019-09-18 10:58:53)

## 2022-04-14 VITALS
WEIGHT: 185.63 LBS | BODY MASS INDEX: 29.83 KG/M2 | SYSTOLIC BLOOD PRESSURE: 92 MMHG | OXYGEN SATURATION: 98 % | HEART RATE: 73 BPM | TEMPERATURE: 96.8 F | DIASTOLIC BLOOD PRESSURE: 48 MMHG | RESPIRATION RATE: 16 BRPM | HEIGHT: 66 IN

## 2022-04-14 LAB
BASOPHILS # BLD AUTO: 0.2 % (ref 0–1.8)
BASOPHILS # BLD: 0.02 K/UL (ref 0–0.12)
EOSINOPHIL # BLD AUTO: 0 K/UL (ref 0–0.51)
EOSINOPHIL NFR BLD: 0 % (ref 0–6.9)
ERYTHROCYTE [DISTWIDTH] IN BLOOD BY AUTOMATED COUNT: 46 FL (ref 35.9–50)
GLUCOSE BLD STRIP.AUTO-MCNC: 151 MG/DL (ref 65–99)
GLUCOSE BLD STRIP.AUTO-MCNC: 275 MG/DL (ref 65–99)
HCT VFR BLD AUTO: 34.5 % (ref 42–52)
HGB BLD-MCNC: 10.9 G/DL (ref 14–18)
HGB BLD-MCNC: 11.1 G/DL (ref 14–18)
IMM GRANULOCYTES # BLD AUTO: 0.13 K/UL (ref 0–0.11)
IMM GRANULOCYTES NFR BLD AUTO: 1.3 % (ref 0–0.9)
LYMPHOCYTES # BLD AUTO: 1.38 K/UL (ref 1–4.8)
LYMPHOCYTES NFR BLD: 13.4 % (ref 22–41)
MCH RBC QN AUTO: 28.2 PG (ref 27–33)
MCHC RBC AUTO-ENTMCNC: 32.2 G/DL (ref 33.7–35.3)
MCV RBC AUTO: 87.8 FL (ref 81.4–97.8)
MONOCYTES # BLD AUTO: 0.84 K/UL (ref 0–0.85)
MONOCYTES NFR BLD AUTO: 8.2 % (ref 0–13.4)
NEUTROPHILS # BLD AUTO: 7.91 K/UL (ref 1.82–7.42)
NEUTROPHILS NFR BLD: 76.9 % (ref 44–72)
NRBC # BLD AUTO: 0 K/UL
NRBC BLD-RTO: 0 /100 WBC
PLATELET # BLD AUTO: 311 K/UL (ref 164–446)
PMV BLD AUTO: 9.6 FL (ref 9–12.9)
RBC # BLD AUTO: 3.93 M/UL (ref 4.7–6.1)
WBC # BLD AUTO: 10.3 K/UL (ref 4.8–10.8)

## 2022-04-14 PROCEDURE — 85018 HEMOGLOBIN: CPT

## 2022-04-14 PROCEDURE — C9113 INJ PANTOPRAZOLE SODIUM, VIA: HCPCS

## 2022-04-14 PROCEDURE — 700111 HCHG RX REV CODE 636 W/ 250 OVERRIDE (IP): Performed by: INTERNAL MEDICINE

## 2022-04-14 PROCEDURE — 99239 HOSP IP/OBS DSCHRG MGMT >30: CPT | Performed by: STUDENT IN AN ORGANIZED HEALTH CARE EDUCATION/TRAINING PROGRAM

## 2022-04-14 PROCEDURE — 36415 COLL VENOUS BLD VENIPUNCTURE: CPT

## 2022-04-14 PROCEDURE — 85025 COMPLETE CBC W/AUTO DIFF WBC: CPT

## 2022-04-14 PROCEDURE — 82962 GLUCOSE BLOOD TEST: CPT

## 2022-04-14 PROCEDURE — 700102 HCHG RX REV CODE 250 W/ 637 OVERRIDE(OP): Performed by: INTERNAL MEDICINE

## 2022-04-14 PROCEDURE — A9270 NON-COVERED ITEM OR SERVICE: HCPCS | Performed by: INTERNAL MEDICINE

## 2022-04-14 PROCEDURE — 700111 HCHG RX REV CODE 636 W/ 250 OVERRIDE (IP)

## 2022-04-14 RX ADMIN — DEXAMETHASONE SODIUM PHOSPHATE 4 MG: 4 INJECTION, SOLUTION INTRA-ARTICULAR; INTRALESIONAL; INTRAMUSCULAR; INTRAVENOUS; SOFT TISSUE at 12:05

## 2022-04-14 RX ADMIN — INSULIN LISPRO 2 UNITS: 100 INJECTION, SOLUTION INTRAVENOUS; SUBCUTANEOUS at 12:04

## 2022-04-14 RX ADMIN — DEXAMETHASONE SODIUM PHOSPHATE 4 MG: 4 INJECTION, SOLUTION INTRA-ARTICULAR; INTRALESIONAL; INTRAMUSCULAR; INTRAVENOUS; SOFT TISSUE at 06:08

## 2022-04-14 RX ADMIN — DEXAMETHASONE SODIUM PHOSPHATE 4 MG: 4 INJECTION, SOLUTION INTRA-ARTICULAR; INTRALESIONAL; INTRAMUSCULAR; INTRAVENOUS; SOFT TISSUE at 00:59

## 2022-04-14 RX ADMIN — LORAZEPAM 1 MG: 2 INJECTION INTRAMUSCULAR; INTRAVENOUS at 08:41

## 2022-04-14 RX ADMIN — INSULIN LISPRO 6 UNITS: 100 INJECTION, SOLUTION INTRAVENOUS; SUBCUTANEOUS at 06:26

## 2022-04-14 RX ADMIN — LEVETIRACETAM 500 MG: 500 TABLET, FILM COATED ORAL at 06:09

## 2022-04-14 RX ADMIN — HALOPERIDOL LACTATE 1 MG: 5 INJECTION, SOLUTION INTRAMUSCULAR at 06:09

## 2022-04-14 RX ADMIN — INSULIN LISPRO 6 UNITS: 100 INJECTION, SOLUTION INTRAVENOUS; SUBCUTANEOUS at 12:04

## 2022-04-14 RX ADMIN — PANTOPRAZOLE SODIUM 40 MG: 40 INJECTION, POWDER, LYOPHILIZED, FOR SOLUTION INTRAVENOUS at 06:08

## 2022-04-14 RX ADMIN — INSULIN LISPRO 5 UNITS: 100 INJECTION, SOLUTION INTRAVENOUS; SUBCUTANEOUS at 06:27

## 2022-04-14 ASSESSMENT — ENCOUNTER SYMPTOMS
COUGH: 0
BLURRED VISION: 0
CHILLS: 0
HEARTBURN: 0
FEVER: 0
VOMITING: 0
INSOMNIA: 1
WEAKNESS: 0
NAUSEA: 0
SHORTNESS OF BREATH: 0
SPEECH CHANGE: 0
SENSORY CHANGE: 0
MYALGIAS: 0
NERVOUS/ANXIOUS: 1
DOUBLE VISION: 0
HEADACHES: 1
CONSTIPATION: 1
MEMORY LOSS: 1
DIZZINESS: 0

## 2022-04-14 NOTE — PROGRESS NOTES
Patient was discharged to [facility], report was given to Dino bedside RN at receiving facility.

## 2022-04-14 NOTE — PROGRESS NOTES
[facility] Medicine Daily Progress Note    Date of Service  4/13/2022    Chief Complaint  Mark Mareclo is a 72 y.o. male admitted 4/7/2022 with headache     Hospital Course  71 yo male with past medical history of type II DM, hypertension, DLD and history of GERD/GI bleed who presented to the hospital on 4/7/2022 with worsening headache. CT on admission showed right temporal mass concerning for GBM. Neurosurgery was consulted and recommended MRI which revealed infiltrative mass involving the right parieto-occipital region and in the splenium of corpus callosum with surrounding T2 signal abnormality that extends into the right temporal, parietal, occipital region as well as the left parietal region across the corpus callosum. 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   CT C/A/P was also done to evaluate for possible primary malignancy of which none was found.     During admission pt was noted to have an episode of melena, he did have a 1 point drop in Hgb and occult positive stool. He was started on Protonix and monitored.       Interval Problem Update  Patient seen and examined, he is awake and alert, oriented to self, intermittent confused, cognition much better when partner at bedside   - spoke with [facility], patient accepted as transfer, pending transportation set up, CM aware   - continue decadron and keppra   - covid test ordered   - vitals stable, H/H stable       I have personally seen and examined the patient at bedside. I discussed the plan of care with patient, bedside RN and .    Consultants/Specialty  neurosurgery    Code Status  Full Code    Disposition  Patient is medically cleared for discharge.   Anticipate discharge to to a short-term general hosptial for inpatient care.  I have placed the appropriate orders for post-discharge needs.    Review of Systems  Review of Systems   Constitutional: Positive for malaise/fatigue. Negative for chills and fever.   HENT: Negative for  congestion.    Eyes: Negative for blurred vision and double vision.   Respiratory: Negative for cough and shortness of breath.    Cardiovascular: Negative for chest pain and leg swelling.   Gastrointestinal: Positive for constipation and melena. Negative for heartburn, nausea and vomiting.   Genitourinary: Negative for dysuria and urgency.   Musculoskeletal: Negative for myalgias.   Neurological: Positive for headaches. Negative for dizziness, sensory change, speech change and weakness.   Psychiatric/Behavioral: Positive for memory loss. The patient is nervous/anxious and has insomnia.         Physical Exam  Temp:  [36 °C (96.8 °F)-36.7 °C (98 °F)] 36 °C (96.8 °F)  Pulse:  [72-86] 73  Resp:  [16-18] 16  BP: ()/(42-66) 92/48  SpO2:  [92 %-98 %] 98 %    Physical Exam  Vitals and nursing note reviewed.   Constitutional:       General: He is not in acute distress.     Appearance: He is obese. He is not ill-appearing or toxic-appearing.   HENT:      Head: Normocephalic and atraumatic.      Mouth/Throat:      Mouth: Mucous membranes are dry.      Pharynx: Oropharynx is clear.   Eyes:      Extraocular Movements: Extraocular movements intact.      Conjunctiva/sclera: Conjunctivae normal.   Cardiovascular:      Rate and Rhythm: Normal rate and regular rhythm.      Heart sounds: No murmur heard.  Pulmonary:      Effort: Pulmonary effort is normal.      Breath sounds: Normal breath sounds.   Abdominal:      General: Bowel sounds are normal. There is no distension.      Palpations: Abdomen is soft.      Tenderness: There is no abdominal tenderness. There is no guarding or rebound.   Musculoskeletal:         General: Normal range of motion.      Cervical back: Neck supple.   Skin:     General: Skin is warm.   Neurological:      Mental Status: He is alert. He is disoriented.      Cranial Nerves: No cranial nerve deficit.      Coordination: Coordination normal.      Comments: Speech is clear and fluent, moving all  extremities, no focal deficits   Psychiatric:      Comments: Anxious affect, confused to situation          Fluids    Intake/Output Summary (Last 24 hours) at 4/14/2022 1436  Last data filed at 4/14/2022 0815  Gross per 24 hour   Intake 1200 ml   Output 300 ml   Net 900 ml       Laboratory  Recent Labs     04/12/22 0227 04/12/22  1135 04/12/22 1951 04/13/22 0326 04/13/22  1236 04/13/22  1934 04/14/22 0325 04/14/22  1112   WBC 9.6  --   --  8.4  --   --  10.3  --    RBC 3.85*  --   --  3.81*  --   --  3.93*  --    HEMOGLOBIN 10.8* 10.9*   < > 10.9*   < > 10.5* 11.1* 10.9*   HEMATOCRIT 32.6* 34.3*  --  32.5*  --   --  34.5*  --    MCV 84.7  --   --  85.3  --   --  87.8  --    MCH 28.1  --   --  28.6  --   --  28.2  --    MCHC 33.1*  --   --  33.5*  --   --  32.2*  --    RDW 44.0  --   --  44.1  --   --  46.0  --    PLATELETCT 305  --   --  279  --   --  311  --    MPV 9.9  --   --  9.5  --   --  9.6  --     < > = values in this interval not displayed.     Recent Labs     04/12/22 0227 04/13/22 0326   SODIUM 137 137   POTASSIUM 4.2 4.0   CHLORIDE 101 102   CO2 23 24   GLUCOSE 136* 125*   BUN 26* 31*   CREATININE 0.87 0.62   CALCIUM 9.1 8.8     Recent Labs     04/12/22 1135   APTT 24.8   INR 1.18*               Imaging  CT-CHEST,ABDOMEN,PELVIS WITH   Final Result      1.  No primary malignancy or metastatic disease identified in the chest, abdomen, or pelvis.      2.  Bibasilar atelectasis. Patchy groundglass density may be related to small airways disease.      3.  Diverticulosis.      4.  Umbilical hernia contains fat.      5.  Atherosclerosis.         MR-BRAIN-WITH & W/O   Final Result         Large irregularly enhancing mass in the right parieto-occipital region with significant surrounding vasogenic edema. The lesion infiltrates the splenium of corpus callosum. Nonenhancing T2 signal abnormality also extends to the left parietal region    across the corpus callosum. There is mild mass effect upon the right  lateral ventricle with midline shift to the left measuring 3 to 4 mm.               MR-STEALTH BRAIN WITH & W/O   Final Result         Infiltrative mass involving the right parieto-occipital region and in the splenium of corpus callosum with surrounding T2 signal abnormality that extends into the right temporal, parietal, occipital region as well as the left parietal region across the    corpus callosum. 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      CT-HEAD W/O   Final Result         1.  Heterogeneous right parietal mass with associated vasogenic edema resulting in effacement of the posterior right ventricle and 5 mm right to left midline shift. Further characterization with MRI of the brain with contrast recommended.   2.  Atherosclerosis.              Assessment/Plan  * Brain mass- (present on admission)  Assessment & Plan  MRI showing infiltrative mass in the right parieto-occipital region complicated by vasogenic edema and a 5 mm right to left shift, consistent with high-grade glioma   Visual field compromise  dexamethasone 4mg q6   Keppra 500mg BID  CT C/A/P without obvious primary malignancy seen   Neurosurgery following, pt and SO want care in their home [facility] transfer requested and accepted    Encephalopathy acute  Assessment & Plan  Secondary to brain mass  Haldol 1mg IV every 8 hours   Seizure aspiration and fall precautions   Neurosurgery following   Continue steroids and keppra     Diabetes type 2, uncontrolled (HCC)- (present on admission)  Assessment & Plan  Regular insulin sliding scale every 6 hours as needed  Lantus added 4/9, CTM, titrate daily to goal bg     Depression- (present on admission)  Assessment & Plan  Home SSRI       VTE prophylaxis: SCDs/TEDs    I have performed a physical exam and reviewed and updated ROS and Plan today (4/13/2022). In review of yesterday's note (4/12/2022), there are no changes except as documented above.

## 2022-04-14 NOTE — DISCHARGE PLANNING
Anticipated Discharge Disposition:   [facility]  [facility]    Action:    Pt accepted to [facility] by Dr. Boss.  Transport confirmed for 1230 with [facility].  Pts significant other, Jose in agreement.  Verbal for Cobra obtained.    Cobra signed by Dr. Granados.    DC packet given to bedside RN, Kim.    Barriers to Discharge:    None    Plan:    DC

## 2022-11-04 ENCOUNTER — PATIENT MESSAGE (OUTPATIENT)
Dept: HEALTH INFORMATION MANAGEMENT | Facility: OTHER | Age: 72
End: 2022-11-04

## 2024-12-22 NOTE — CARE PLAN
The patient is Stable - Low risk of patient condition declining or worsening    Shift Goals  Clinical Goals: Safety  Patient Goals: LUIS  Family Goals: LUIS    Progress made toward(s) clinical / shift goals: All fall precautions in place    Patient is not progressing towards the following goals:      Problem: Fall Risk  Goal: Patient will remain free from falls  Outcome: Not Progressing   Pt had fall on 4/9  
The patient is Stable - Low risk of patient condition declining or worsening    Shift Goals  Clinical Goals: Safety, Stable neuros  Patient Goals: LUIS  Family Goals: LUIS    Progress made toward(s) clinical / shift goals:  Stable Neuro, pt had fall      Problem: Pain - Standard  Goal: Alleviation of pain or a reduction in pain to the patient’s comfort goal  Outcome: Progressing     Problem: Knowledge Deficit - Standard  Goal: Patient and family/care givers will demonstrate understanding of plan of care, disease process/condition, diagnostic tests and medications  Outcome: Progressing       Patient is not progressing towards the following goals:      Problem: Fall Risk  Goal: Patient will remain free from falls  Outcome: Not Progressing   Pt was in recliner trying to turn onto his side and slipped off the chair onto his butt, all fall precautions in place  
The patient is Stable - Low risk of patient condition declining or worsening    Shift Goals  Clinical Goals: safety  Patient Goals: LUIS  Family Goals: LUIS    Progress made toward(s) clinical / shift goals:  Pt. Is safely resting in bed with fall precautions in place. Will continue to monitor    Patient is not progressing towards the following goals:n/a      
The patient is Stable - Low risk of patient condition declining or worsening    Shift Goals  Clinical Goals: safety  Patient Goals: eat, shower  Family Goals: LUIS    Progress made toward(s) clinical / shift goals:      Problem: Pain - Standard  Goal: Alleviation of pain or a reduction in pain to the patient’s comfort goal  Outcome: Progressing  Patient verbalizes pain and requests medications if present.       Patient is not progressing towards the following goals:      Problem: Knowledge Deficit - Standard  Goal: Patient and family/care givers will demonstrate understanding of plan of care, disease process/condition, diagnostic tests and medications  Outcome: Not Progressing  Per notes, family wants to transfer patient to [facility] for further care     Problem: Fall Risk  Goal: Patient will remain free from falls  Outcome: Not Progressing  Patient remains impulsive and does not use call light appropriately, sitter is present.      
The patient is Stable - Low risk of patient condition declining or worsening    Shift Goals  Clinical Goals: safety  Patient Goals: eat, shower  Family Goals: LUIS    Progress made toward(s) clinical / shift goals:  pt updated on POC, bed locked and in lowest position, safety sitter in place, Q4 neuro checks in place, NPO at midnight    Patient is not progressing towards the following goals:      
The patient is Stable - Low risk of patient condition declining or worsening    Shift Goals  Clinical Goals: safety  Patient Goals: go home  Family Goals: LUIS    Progress made toward(s) clinical / shift goals:  pt updated on POC, bed locked and in lowest position, call light within reach, report given to safety sitter, Q4 neuro checks in place    Patient is not progressing towards the following goals:      
The patient is Stable - Low risk of patient condition declining or worsening    Shift Goals  Clinical Goals: safety  Patient Goals: go home; eat  Family Goals: LUIS    Progress made toward(s) clinical / shift goals:  Pt. Is comfortably resting in bed with fall precautions in place and a sitter at bedside for safety.     Patient is not progressing towards the following goals:n/a      
The patient is Watcher - Medium risk of patient condition declining or worsening    Shift Goals  Clinical Goals: Safety  Patient Goals: LUIS  Family Goals: LUIS    Progress made toward(s) clinical / shift goals:  Pt remained stable    Patient is not progressing towards the following goals:      Problem: Fall Risk  Goal: Patient will remain free from falls  4/11/2022 0414 by Nilam Wills R.N.  Outcome: Progressing    
The patient is Watcher - Medium risk of patient condition declining or worsening    Shift Goals  Clinical Goals: Stable neuro exams, fall prevention  Patient Goals: LUIS  Family Goals: LUIS    Progress made toward(s) clinical / shift goals:  NA      Patient is not progressing towards the following goals:      Problem: Knowledge Deficit - Standard  Goal: Patient and family/care givers will demonstrate understanding of plan of care, disease process/condition, diagnostic tests and medications  Outcome: Not Progressing  Patient does not demonstrate learning or understanding of plan of care or interventions being implemented     Problem: Fall Risk  Goal: Patient will remain free from falls  Outcome: Not Progressing  Patient has fallen during this visit and continues to demonstrate impulsive behaviors that could increase risk of falls     
The patient is {Patient Stability:[id number]}    Shift Goals  Clinical Goals: Safety  Patient Goals: LUIS  Family Goals: LUIS    Progress made toward(s) clinical / shift goals:  ***    Patient is not progressing towards the following goals:      Problem: Fall Risk  Goal: Patient will remain free from falls  Outcome: Not Progressing     
No